# Patient Record
Sex: FEMALE | Race: ASIAN | NOT HISPANIC OR LATINO | Employment: FULL TIME | URBAN - METROPOLITAN AREA
[De-identification: names, ages, dates, MRNs, and addresses within clinical notes are randomized per-mention and may not be internally consistent; named-entity substitution may affect disease eponyms.]

---

## 2020-06-03 LAB
HBV SURFACE AG SERPL QL IA: NEGATIVE
HIV 1+2 AB+HIV1 P24 AG SERPL QL IA: NEGATIVE
RUBELLA ANTIBODY IGG QUANTITATIVE: NORMAL IU/ML

## 2020-10-24 ENCOUNTER — COMMUNICATION - HEALTHEAST (OUTPATIENT)
Dept: SCHEDULING | Facility: CLINIC | Age: 25
End: 2020-10-24

## 2020-10-24 ENCOUNTER — HOSPITAL ENCOUNTER (INPATIENT)
Facility: CLINIC | Age: 25
LOS: 1 days | Discharge: HOME | End: 2020-10-25
Attending: OBSTETRICS & GYNECOLOGY | Admitting: OBSTETRICS & GYNECOLOGY
Payer: COMMERCIAL

## 2020-10-24 DIAGNOSIS — Z30.011 ENCOUNTER FOR INITIAL PRESCRIPTION OF CONTRACEPTIVE PILLS: Primary | ICD-10-CM

## 2020-10-24 PROBLEM — O60.00 PRETERM LABOR: Status: ACTIVE | Noted: 2020-10-24

## 2020-10-24 LAB
ABO + RH BLD: NORMAL
ABO + RH BLD: NORMAL
ALT SERPL W P-5'-P-CCNC: 13 U/L (ref 0–50)
AST SERPL W P-5'-P-CCNC: 9 U/L (ref 0–45)
BLD GP AB SCN SERPL QL: NORMAL
BLOOD BANK CMNT PATIENT-IMP: NORMAL
CREAT SERPL-MCNC: 0.63 MG/DL (ref 0.52–1.04)
ERYTHROCYTE [DISTWIDTH] IN BLOOD BY AUTOMATED COUNT: 14.3 % (ref 10–15)
GFR SERPL CREATININE-BSD FRML MDRD: >90 ML/MIN/{1.73_M2}
HCT VFR BLD AUTO: 31.6 % (ref 35–47)
HGB BLD-MCNC: 9.9 G/DL (ref 11.7–15.7)
MCH RBC QN AUTO: 25.6 PG (ref 26.5–33)
MCHC RBC AUTO-ENTMCNC: 31.3 G/DL (ref 31.5–36.5)
MCV RBC AUTO: 82 FL (ref 78–100)
PLATELET # BLD AUTO: 196 10E9/L (ref 150–450)
RBC # BLD AUTO: 3.86 10E12/L (ref 3.8–5.2)
SPECIMEN EXP DATE BLD: NORMAL
T PALLIDUM AB SER QL: NONREACTIVE
WBC # BLD AUTO: 22.9 10E9/L (ref 4–11)

## 2020-10-24 PROCEDURE — 36415 COLL VENOUS BLD VENIPUNCTURE: CPT | Performed by: STUDENT IN AN ORGANIZED HEALTH CARE EDUCATION/TRAINING PROGRAM

## 2020-10-24 PROCEDURE — 82565 ASSAY OF CREATININE: CPT | Performed by: STUDENT IN AN ORGANIZED HEALTH CARE EDUCATION/TRAINING PROGRAM

## 2020-10-24 PROCEDURE — 84460 ALANINE AMINO (ALT) (SGPT): CPT | Performed by: STUDENT IN AN ORGANIZED HEALTH CARE EDUCATION/TRAINING PROGRAM

## 2020-10-24 PROCEDURE — 84450 TRANSFERASE (AST) (SGOT): CPT | Performed by: STUDENT IN AN ORGANIZED HEALTH CARE EDUCATION/TRAINING PROGRAM

## 2020-10-24 PROCEDURE — 86901 BLOOD TYPING SEROLOGIC RH(D): CPT | Performed by: STUDENT IN AN ORGANIZED HEALTH CARE EDUCATION/TRAINING PROGRAM

## 2020-10-24 PROCEDURE — 258N000003 HC RX IP 258 OP 636: Performed by: STUDENT IN AN ORGANIZED HEALTH CARE EDUCATION/TRAINING PROGRAM

## 2020-10-24 PROCEDURE — 250N000013 HC RX MED GY IP 250 OP 250 PS 637: Performed by: STUDENT IN AN ORGANIZED HEALTH CARE EDUCATION/TRAINING PROGRAM

## 2020-10-24 PROCEDURE — 86900 BLOOD TYPING SEROLOGIC ABO: CPT | Performed by: STUDENT IN AN ORGANIZED HEALTH CARE EDUCATION/TRAINING PROGRAM

## 2020-10-24 PROCEDURE — 86850 RBC ANTIBODY SCREEN: CPT | Performed by: STUDENT IN AN ORGANIZED HEALTH CARE EDUCATION/TRAINING PROGRAM

## 2020-10-24 PROCEDURE — 250N000011 HC RX IP 250 OP 636: Performed by: STUDENT IN AN ORGANIZED HEALTH CARE EDUCATION/TRAINING PROGRAM

## 2020-10-24 PROCEDURE — 86780 TREPONEMA PALLIDUM: CPT | Performed by: STUDENT IN AN ORGANIZED HEALTH CARE EDUCATION/TRAINING PROGRAM

## 2020-10-24 PROCEDURE — 85027 COMPLETE CBC AUTOMATED: CPT | Performed by: STUDENT IN AN ORGANIZED HEALTH CARE EDUCATION/TRAINING PROGRAM

## 2020-10-24 PROCEDURE — 88305 TISSUE EXAM BY PATHOLOGIST: CPT | Mod: 26 | Performed by: PATHOLOGY

## 2020-10-24 PROCEDURE — 250N000009 HC RX 250

## 2020-10-24 PROCEDURE — 88305 TISSUE EXAM BY PATHOLOGIST: CPT | Mod: TC | Performed by: STUDENT IN AN ORGANIZED HEALTH CARE EDUCATION/TRAINING PROGRAM

## 2020-10-24 PROCEDURE — 258N000003 HC RX IP 258 OP 636: Performed by: OBSTETRICS & GYNECOLOGY

## 2020-10-24 PROCEDURE — 250N000011 HC RX IP 250 OP 636: Performed by: OBSTETRICS & GYNECOLOGY

## 2020-10-24 PROCEDURE — 59409 OBSTETRICAL CARE: CPT | Mod: GC | Performed by: OBSTETRICS & GYNECOLOGY

## 2020-10-24 PROCEDURE — 722N000001 HC LABOR CARE VAGINAL DELIVERY SINGLE

## 2020-10-24 PROCEDURE — 120N000002 HC R&B MED SURG/OB UMMC

## 2020-10-24 RX ORDER — FERROUS SULFATE 325(65) MG
325 TABLET, DELAYED RELEASE (ENTERIC COATED) ORAL DAILY
COMMUNITY
End: 2022-07-12

## 2020-10-24 RX ORDER — AMOXICILLIN 250 MG
2 CAPSULE ORAL 2 TIMES DAILY
Status: DISCONTINUED | OUTPATIENT
Start: 2020-10-24 | End: 2020-10-25 | Stop reason: HOSPADM

## 2020-10-24 RX ORDER — OXYTOCIN/0.9 % SODIUM CHLORIDE 30/500 ML
340 PLASTIC BAG, INJECTION (ML) INTRAVENOUS CONTINUOUS PRN
Status: DISCONTINUED | OUTPATIENT
Start: 2020-10-24 | End: 2020-10-25 | Stop reason: HOSPADM

## 2020-10-24 RX ORDER — MODIFIED LANOLIN
OINTMENT (GRAM) TOPICAL
Status: DISCONTINUED | OUTPATIENT
Start: 2020-10-24 | End: 2020-10-25 | Stop reason: HOSPADM

## 2020-10-24 RX ORDER — PENICILLIN G POTASSIUM 5000000 [IU]/1
5 INJECTION, POWDER, FOR SOLUTION INTRAMUSCULAR; INTRAVENOUS ONCE
Status: DISCONTINUED | OUTPATIENT
Start: 2020-10-24 | End: 2020-10-24

## 2020-10-24 RX ORDER — IBUPROFEN 800 MG/1
800 TABLET, FILM COATED ORAL EVERY 6 HOURS PRN
Status: DISCONTINUED | OUTPATIENT
Start: 2020-10-24 | End: 2020-10-25 | Stop reason: HOSPADM

## 2020-10-24 RX ORDER — HYDROCORTISONE 2.5 %
CREAM (GRAM) TOPICAL 3 TIMES DAILY PRN
Status: DISCONTINUED | OUTPATIENT
Start: 2020-10-24 | End: 2020-10-25 | Stop reason: HOSPADM

## 2020-10-24 RX ORDER — FENTANYL CITRATE 50 UG/ML
50 INJECTION, SOLUTION INTRAMUSCULAR; INTRAVENOUS
Status: DISCONTINUED | OUTPATIENT
Start: 2020-10-24 | End: 2020-10-24

## 2020-10-24 RX ORDER — MAGNESIUM SULFATE IN WATER 40 MG/ML
2 INJECTION, SOLUTION INTRAVENOUS CONTINUOUS
Status: DISCONTINUED | OUTPATIENT
Start: 2020-10-24 | End: 2020-10-24

## 2020-10-24 RX ORDER — SODIUM CHLORIDE, SODIUM LACTATE, POTASSIUM CHLORIDE, CALCIUM CHLORIDE 600; 310; 30; 20 MG/100ML; MG/100ML; MG/100ML; MG/100ML
INJECTION, SOLUTION INTRAVENOUS CONTINUOUS
Status: DISCONTINUED | OUTPATIENT
Start: 2020-10-24 | End: 2020-10-24

## 2020-10-24 RX ORDER — LABETALOL 100 MG/1
100 TABLET, FILM COATED ORAL 2 TIMES DAILY
COMMUNITY
End: 2022-03-07

## 2020-10-24 RX ORDER — INDOMETHACIN 25 MG/1
25 CAPSULE ORAL EVERY 6 HOURS
Status: DISCONTINUED | OUTPATIENT
Start: 2020-10-24 | End: 2020-10-24

## 2020-10-24 RX ORDER — NALOXONE HYDROCHLORIDE 0.4 MG/ML
.1-.4 INJECTION, SOLUTION INTRAMUSCULAR; INTRAVENOUS; SUBCUTANEOUS
Status: DISCONTINUED | OUTPATIENT
Start: 2020-10-24 | End: 2020-10-24

## 2020-10-24 RX ORDER — OXYTOCIN/0.9 % SODIUM CHLORIDE 30/500 ML
100 PLASTIC BAG, INJECTION (ML) INTRAVENOUS CONTINUOUS
Status: DISCONTINUED | OUTPATIENT
Start: 2020-10-24 | End: 2020-10-25 | Stop reason: HOSPADM

## 2020-10-24 RX ORDER — CALCIUM GLUCONATE 94 MG/ML
1 INJECTION, SOLUTION INTRAVENOUS
Status: DISCONTINUED | OUTPATIENT
Start: 2020-10-24 | End: 2020-10-24

## 2020-10-24 RX ORDER — OXYTOCIN 10 [USP'U]/ML
10 INJECTION, SOLUTION INTRAMUSCULAR; INTRAVENOUS
Status: DISCONTINUED | OUTPATIENT
Start: 2020-10-24 | End: 2020-10-25 | Stop reason: HOSPADM

## 2020-10-24 RX ORDER — OXYTOCIN 10 [USP'U]/ML
INJECTION, SOLUTION INTRAMUSCULAR; INTRAVENOUS
Status: DISCONTINUED
Start: 2020-10-24 | End: 2020-10-24 | Stop reason: WASHOUT

## 2020-10-24 RX ORDER — BISACODYL 10 MG
10 SUPPOSITORY, RECTAL RECTAL DAILY PRN
Status: DISCONTINUED | OUTPATIENT
Start: 2020-10-26 | End: 2020-10-25 | Stop reason: HOSPADM

## 2020-10-24 RX ORDER — MISOPROSTOL 200 UG/1
TABLET ORAL
Status: DISCONTINUED
Start: 2020-10-24 | End: 2020-10-24 | Stop reason: WASHOUT

## 2020-10-24 RX ORDER — MISOPROSTOL 200 UG/1
800 TABLET ORAL
Status: DISCONTINUED | OUTPATIENT
Start: 2020-10-24 | End: 2020-10-25 | Stop reason: HOSPADM

## 2020-10-24 RX ORDER — PRENATAL VIT/IRON FUM/FOLIC AC 27MG-0.8MG
1 TABLET ORAL DAILY
COMMUNITY
End: 2022-07-12

## 2020-10-24 RX ORDER — LIDOCAINE HYDROCHLORIDE 10 MG/ML
INJECTION, SOLUTION EPIDURAL; INFILTRATION; INTRACAUDAL; PERINEURAL
Status: DISCONTINUED
Start: 2020-10-24 | End: 2020-10-24 | Stop reason: WASHOUT

## 2020-10-24 RX ORDER — CARBOPROST TROMETHAMINE 250 UG/ML
250 INJECTION, SOLUTION INTRAMUSCULAR
Status: DISCONTINUED | OUTPATIENT
Start: 2020-10-24 | End: 2020-10-25 | Stop reason: HOSPADM

## 2020-10-24 RX ORDER — LIDOCAINE 40 MG/G
CREAM TOPICAL
Status: DISCONTINUED | OUTPATIENT
Start: 2020-10-24 | End: 2020-10-24

## 2020-10-24 RX ORDER — ACETAMINOPHEN 325 MG/1
650 TABLET ORAL EVERY 4 HOURS PRN
Status: DISCONTINUED | OUTPATIENT
Start: 2020-10-24 | End: 2020-10-25 | Stop reason: HOSPADM

## 2020-10-24 RX ORDER — CLINDAMYCIN PHOSPHATE 900 MG/50ML
900 INJECTION, SOLUTION INTRAVENOUS EVERY 8 HOURS
Status: DISCONTINUED | OUTPATIENT
Start: 2020-10-24 | End: 2020-10-24

## 2020-10-24 RX ORDER — INDOMETHACIN 50 MG/1
50 CAPSULE ORAL ONCE
Status: COMPLETED | OUTPATIENT
Start: 2020-10-24 | End: 2020-10-24

## 2020-10-24 RX ORDER — AMOXICILLIN 250 MG
1 CAPSULE ORAL 2 TIMES DAILY
Status: DISCONTINUED | OUTPATIENT
Start: 2020-10-24 | End: 2020-10-25 | Stop reason: HOSPADM

## 2020-10-24 RX ORDER — OXYTOCIN/0.9 % SODIUM CHLORIDE 30/500 ML
PLASTIC BAG, INJECTION (ML) INTRAVENOUS
Status: COMPLETED
Start: 2020-10-24 | End: 2020-10-24

## 2020-10-24 RX ORDER — LABETALOL 100 MG/1
100 TABLET, FILM COATED ORAL 2 TIMES DAILY
Status: DISCONTINUED | OUTPATIENT
Start: 2020-10-24 | End: 2020-10-25 | Stop reason: HOSPADM

## 2020-10-24 RX ORDER — NALOXONE HYDROCHLORIDE 0.4 MG/ML
.1-.4 INJECTION, SOLUTION INTRAMUSCULAR; INTRAVENOUS; SUBCUTANEOUS
Status: DISCONTINUED | OUTPATIENT
Start: 2020-10-24 | End: 2020-10-25 | Stop reason: HOSPADM

## 2020-10-24 RX ADMIN — SODIUM CHLORIDE 2.5 MILLION UNITS: 9 INJECTION, SOLUTION INTRAVENOUS at 05:35

## 2020-10-24 RX ADMIN — OXYTOCIN-SODIUM CHLORIDE 0.9% IV SOLN 30 UNIT/500ML 100 ML/HR: 30-0.9/5 SOLUTION at 09:34

## 2020-10-24 RX ADMIN — IBUPROFEN 800 MG: 800 TABLET ORAL at 09:33

## 2020-10-24 RX ADMIN — SODIUM CHLORIDE, POTASSIUM CHLORIDE, SODIUM LACTATE AND CALCIUM CHLORIDE: 600; 310; 30; 20 INJECTION, SOLUTION INTRAVENOUS at 08:28

## 2020-10-24 RX ADMIN — Medication 100 ML/HR: at 09:34

## 2020-10-24 RX ADMIN — IBUPROFEN 800 MG: 800 TABLET ORAL at 23:21

## 2020-10-24 RX ADMIN — DOCUSATE SODIUM AND SENNOSIDES 1 TABLET: 8.6; 5 TABLET ORAL at 19:31

## 2020-10-24 RX ADMIN — LABETALOL HYDROCHLORIDE 100 MG: 100 TABLET, FILM COATED ORAL at 19:57

## 2020-10-24 RX ADMIN — GENTAMICIN SULFATE 170 MG: 40 INJECTION, SOLUTION INTRAMUSCULAR; INTRAVENOUS at 08:28

## 2020-10-24 RX ADMIN — FENTANYL CITRATE 50 MCG: 50 INJECTION, SOLUTION INTRAMUSCULAR; INTRAVENOUS at 06:01

## 2020-10-24 RX ADMIN — FENTANYL CITRATE 50 MCG: 50 INJECTION, SOLUTION INTRAMUSCULAR; INTRAVENOUS at 04:28

## 2020-10-24 RX ADMIN — SODIUM CHLORIDE, POTASSIUM CHLORIDE, SODIUM LACTATE AND CALCIUM CHLORIDE: 600; 310; 30; 20 INJECTION, SOLUTION INTRAVENOUS at 04:28

## 2020-10-24 RX ADMIN — INDOMETHACIN 50 MG: 50 CAPSULE ORAL at 04:28

## 2020-10-24 NOTE — PROGRESS NOTES
Vaginal Delivery Note   of viable Male with Dr Lyons and Dr Cortez in attendance.  NICU present.  Infant brought over to warmer and NICU took over infant care. Placenta delivered with out complication, none, first laceration, without repair, bernard cares provided.  Mother in stable condition and baby brought over to NICU.

## 2020-10-24 NOTE — L&D DELIVERY NOTE
OB Vaginal Delivery Note    Tony Nova MRN# [id number]   Age: 24 year old YOB: 1995       GA: Unknown  GP:   Labor Complications: Chorioamnionitis   EBL:   mL  Delivery QBL: 145 mL  Delivery Type: Vaginal, Spontaneous   ROM to Delivery Time: (Delivered) Hours: 22 Minutes: 7  [place] Weight: 0.92 kg (2 lb 0.5 oz)    1 Minute 5 Minute 10 Minute   Apgar Totals: 1   2   6     CHEVY MARS;SARAY POMPA;ADRIANA MCELROY     Delivery Details:  Tony Nova is a 24 year old  female who was admitted as a transfer of care with PPROM at 26w6d. She received 1 dose of BMZ prior to transfer to [facility] and was started on IV magnesium for neuroprotection prophylaxis.  She received 1 dose of indomethacin on arrival (0428) however was painfully jorje and was found to be making cervical change. She was started on PCN for GBS prophylaxis. She received 1 dose of fentanyl for pain control. FHT became category II with fetal tachycardia and patient had a temperature to 100.3. Due to concern for developing chorioamnionitis she received 1 dose of Gentamycin (following delivery). She progressed to complete at 0745, pushed with excellent maternal effort, and delivered a liveborn male infant from OA position at 0807 on 10/24/2020. Nuchal cord x1, delivered through and then immediately reduced.  handed off to awaiting NICU team. APGARs 1, 2, 6, and 7 at 1 and 5 minutes, 10, 15 min. Weight 920g. Cord immediately clamped and cut, cord segment obtained and sent for gases. IV pitocin was started.  Placenta delivered spontaneously with gentle cord traction and suprapubic countertraction; intact 3V cord, placenta intact.  Examination of perineum revealed a small first degree laceration noted. This was hemostatic after applying pressure and therefore not repaired. . Fundus firm and perineum hemostatic.  Dr. Mcelroy present for delivery     FHTs were difficult to detect while pushing, but fetus was not  noted to be bradycardic- FHTs in 1102-140s while pushing and in between pushing efforts.        Reid Nova-Tony [[id number]]    Labor Event Times    Dilation complete date: 10/24/20 Complete time:  7:45 AM   Start pushing date/time: 10/24/2020 0747      Labor Length    2nd Stage (hrs): 0 (min): 22   3rd Stage (hrs): 0 (min): 5      Labor Events     labor?: Yes   steroids: Partial Course  Labor Type: Spontaneous  Predominate monitoring during 1st stage: continuous electronic fetal monitoring     Antibiotics received during labor?: Yes  Reason for Antibiotics: GBS, Chorioamnionitis  Antibiotics received for GBS: Penicillin  Antibiotics given for Chorioamnionitis: Clindamycin/Gentamicin  Antibiotics Given (Chorioamnionitis): 2-3.9 hours prior to delivery     Rupture identifier: Sac 1  Rupture date/time: 10/23/20 1000   Rupture type: Spontaneous rupture of membranes occuring during spontaneous labor or augmentation  Fluid color: Clear     Delivery/Placenta Date and Time    Delivery Date: 10/24/20 Delivery Time:  8:07 AM   Placenta Date/Time: 10/24/2020  8:12 AM  Oxytocin given at the time of delivery: after delivery of baby     Vaginal Counts     Initial count performed by 2 team members:  Two Team Members   OCTAVIANO Cortez       Needles [employer]   Initial counts 2  5    Added to count       Final counts 2  5    Placed during labor Accounted for at the end of labor   No    No    No     Final count performed by 2 team members:  Two Team Members   OCTAVIANO Cortez      Final count correct?: Yes     Apgars    Living status: Living   1 Minute 5 Minute 10 Minute 15 Minute 20 Minute   Skin color: 0  0  1  1     Heart rate: 1  1  2  2     Reflex irritability: 0  0  0  1     Muscle tone: 0  0  1  1     Respiratory effort: 0  1  2  2     Total: 1  2  6  7     Apgars assigned by: KATHE SHELTON,CNP     Cord    Vessels: 3 Vessels Complications: None   Cord Blood Disposition:  Lab Gases Sent?: Yes      [place] Resuscitation    Methods: Suctioning, Oxygen, NCPAP, Oximetry, Temp Skin Control, Temp Skin Probe, Transwarmer, Intubation, Mark Puff, Polyethylene Bag  [place] Care at Delivery: NICU team called by Dr. Lyons and Dr. Cortez to attend a vaginal delivery of a , 26w6d GA infant for PPROM, PTL, chorioamnionitis with fetal tachycardia. Infant presented with apnea, cyanosis, and was limp.  Cord cut and clamped, infant placed in polyethylene bag and brought to radiant warmer. PPV PIP 25, PEEP +6, FiO2 30% administered for apnea.  Preductal pulse oximeter placed on R hand, ECG leads placed.  HR 80s, SpO2 20s with PPV 25/6.  FiO2 increased to 50% then 100%.  Initially observed chest rise, breath sounds diminished but heart rate not increasing.  Mask adjusted, head and shoulders repositioned, PPV delivered, still unable to attain good chest rise, mouth suctioned, PPV continued. At 5 minutes of age, infant with occasional respirations but PPV 25/6 administered, infant pale and still limp, HR 120s, SpO2 25%.  Infant intubated at 6 minutes of age on the second attempt with a 2.5ETT and 00 blade.  Condensation observed in ETT, colorimetric confirmation obtained, and equal but diminished breath sounds auscultated when ETT retracted to 7cm.  At this time HR 140s but SpO2 ~50% with PPV 25/6 FiO2 100%, 2.5mls of surfactant given in 2 aliquots intratracheally.  At 15 minutes of age, HR 140s, SpO2 92% while receiving PPV 25/6 FiO2 30%, he was pale pink and had some flexion. Infant shown to parents and they were updated at the bedside.  Father accompanied NICU team as we transported infant to NICU for further management of prematurity and respiratory failure with RDS.     Sylvia Walton, APRN, CNP  10/24/2020 10:07 AM    Output in Delivery Room: Voided     [place] Measurements    Weight: 2 lb 0.5 oz       Labor Events and Shoulder Dystocia    Fetal Tracing Prior to Delivery: Category  1  Shoulder dystocia present?: Neg     Delivery (Maternal) (Provider to Complete) (138179)    Episiotomy: None  Perineal lacerations: 1st Repaired?: No      Blood Loss  Mother: Tony Nova #[id number]   Start of Mother's Information    IO Blood Loss  10/23/20 2007 - 10/24/20 1123    Delivery QBL (mL) Hospital Encounter 175 mL    Total  175 mL         End of Mother's Information  Mother: Tony Nova #[id number]          Delivery - Provider to Complete ([id number])    Delivering clinician: May Lyons DO  Attempted Delivery Types (Choose all that apply): Spontaneous Vaginal Delivery  Delivery Type (Choose the 1 that will go to the Birth History): Vaginal, Spontaneous                   Other personnel:  Provider Role   Isis Ann RN Hagen, Jennifer A, MD Jacobs, Katherine Marie, DO                 Placenta    Immediate Cord Clamping: Done  Date/Time: 10/24/2020  8:12 AM  Removal: Spontaneous  Disposition: Pathology           Anesthesia    Method: INTRAVENOUS REGIONAL                Presentation and Position    Presentation: Vertex     Occiput Anterior                 FACULTY NOTE:  I was present for the entire second and 3rd labor stage.  Patient pushed very effectively.  Concern for developing chorio given fetal and maternal tachycardia/temp to 100.3 noted prior to delivery.  Patient may have been ruptured for >24 hrs prior to delivery and at least 12 hrs prior to admission for PTL.     Uncomplicated  at 26 6/7 weeks, infant with good color, but no respiratory effort and poor tone at birth.  Cord clamped and cut immediately and baby handed to awaiting NICU staff. Placenta delivered spont. Small first degree abrasion- hemostatic, not requiring suture.     See above NICU note for details.     May Lyons DO FACOG  Maternal Fetal Medicine Specialist  Pager: [phone]  Mobile: [phone]

## 2020-10-24 NOTE — PROVIDER NOTIFICATION
10/24/20 4267   Provider Notification   Provider Name/Title Brett   Method of Notification Electronic Page   Request Evaluate-Remote   Notification Reason Other  (plan for IV Gentamicin)     In report, plan was for one time dose of Gentamicin per Dr. Lyons. Pt has Gentamicin scheduled every 8 hours. Could you clarify when you get a chance? Thanks.

## 2020-10-24 NOTE — PLAN OF CARE
Patient transferred from [place] [facility] for PTL.  Patient presents to birth place via ambulance jorje q3-4 minutes.  See flow sheet for FHR. SVE per nursing report from previous hospital = 2-3cm.   Magnesium running at 2g/hr.  Reports pain from contractions and back ache. Denies pre-e symptoms.  VSS.  Reports leaking clear/yellow fluid.  Notes positive fetal movement.  MD notified of arrival.  Vertex upon ultrasound.  Plan is to continue magnesium, and pcn, start indocin, and manage pain.

## 2020-10-24 NOTE — PROGRESS NOTES
Patient arrived to [facility] unit via wheelchair at 1025,with belongings, accompanied by spouse/ significant other, with infant in NICU. Received report from Isis Ann RN and checked bands. Unit and room orientation completd. Call light given and within arms reach; no concerns present at this time. Continue with plan of care.

## 2020-10-24 NOTE — PROGRESS NOTES
[facility]   Post-partum Note    Name:  Tony Nova  MRN: [id number]    S: Patient doing very well this morning.  Pain is minimal.  Tolerating regular diet without nausea or vomiting. Did notice some dizziness with ambulation at first but now resolved.  Spontaneously voiding. Lochia less than menses.  Planning on the donor breast milk program for baby. Does not desires anything for postpartum contraception.     O:   Patient Vitals for the past 4 hrs:   BP Temp src Resp   10/25/20 0817 128/67 Oral 16   Gen:  Resting comfortably, NAD  CV:  Well perfused  Pulm:  Unlabored breathing on RA  Abd:  Soft, nontender, non-distended. Fundus below umbilicus, firm and non-tender.  Ext:  non-tender, trace edema bilaterally    I/O last 3 completed shifts:  In: -   Out: 175 [Blood:175]    Hgb:   Hemoglobin   Date Value Ref Range Status   10/25/2020 8.8 (L) 11.7 - 15.7 g/dL Final       Assessment/Plan:  24 year old  on PPD #1 s/p uncomplicated  at 26w6d after presenting with ruptured membranes in  labor. Doing well in the early postpartum period.    #Routine postpartum care  -PNC:   Rh pos. Rubella immune. No intervention indicated.  -Pain: Well-controlled with ibuprofen and tylenol  -Hgb: 9.9>>8.8. Asymptomatic, vital signs stable. Will discharge home with iron.  -GI Tolerating regular diet. Prn stool softener, anti-emetics  - Voiding spontaneously   -ID Concern for developing chorioamnionitis just prior to delivery with fetal and maternal tachycardia, rising temperature (Tmax 100.3).  S/p 1 dose of gentamycin immediately following delivery. Leukocytosis noted however was drawn after BMZ dose. No ongoing clinical concerns for infection/postpartum endometritis.  -Feed: Donor breast mild program  -BC: Declines    # delivery at 26w6d  -Infectious work-up at outside hospital unremarkable  -SW consulted     #Chronic hypertension  -Continue home Labetalol 100mg BID, BP currently  normal  -HELLP labs on admission wnl    Dispo: DC likely PPD#2    Desiree Cortez MD  Ob/Gyn PGY-3  October 25, 2020 8:32 AM    I personally examined and evaluated Tony Nova on 10/25/2020.  I discussed the patient with Dr. Cortez and agree with the presentation, exam and plan of care documented in this note with edits by me. Patient desires discharge today.  Mandy Bailey MD MPH    Mandy Bailey MD MPH

## 2020-10-24 NOTE — H&P
"[facility]  OB History and Physical      Tony Nova MRN# [id number]   Age: 24 year old YOB: 1995     CC:  Contractions    HPI:  Ms. Tony Nova is a 24 year old  at 26w6d who presents as a transfer of care from [facility] due to  labor. Patient notes she started having contractions yesterday morning and thought they were normal \"marci-grimes.\" She then decided to go in to get them checked out, at which point she was found to be 3 cm dilated. She also notes clear fluid leakage since yesterday, however the provider at the other hospital noted on speculum exam a large \"bulging bag.\" She denies vaginal bleeding.   + normal fetal movement.    Pregnancy Complications:  -  Chronic hypertension, labetalol 100 mg BID    Prenatal Labs:   No results found for: ABO, RH, AS, HEPBANG, CHPCRT, GCPCRT, TREPAB, RPR, RUBELLAABIGG, HGB, HIV    GBS Status:   No results found for: GBS    OB History  OB History    Para Term  AB Living   1 0 0 0 0 0   SAB TAB Ectopic Multiple Live Births   0 0 0 0 0      # Outcome Date GA Lbr Davian/2nd Weight Sex Delivery Anes PTL Lv   1 Current                PMHx:   Past Medical History:   Diagnosis Date     Hypertension      PSHx:   No past surgical history on file.  Meds:   No medications prior to admission.     Allergies:  No Known Allergies   FmHx: No family history on file.  SocHx: She denies any tobacco, alcohol, or other drug use during this pregnancy.    ROS:   Complete 10-point ROS negative except as noted in HPI.She denies headache, blurry vision, chest pain, shortness of breath, RUQ pain, nausea, vomiting, dysuria, hematuria or extremity edema.    PE:  Vit:   Patient Vitals for the past 4 hrs:   BP Temp Temp src Pulse Resp SpO2   10/24/20 0325 115/62 98.1  F (36.7  C) Oral 113 16 98 %      Gen: Well-appearing, NAD, comfortable when not jorje. Breathing through contractions.   CV: rrr, no mrg   Pulm: Ctab, no " wheezes or crackles   Abd: Soft, gravid, non-tender  Ext: trace LE edema b/l  Cx: Deferred     Pres:  ceph by BSUS      FHT: Baseline 155, mod variability, no accelerations, intermittent variable decelerations   [place]: 3 contractions in 10 minutes      Assessment  Ms. Tony Nova is a 24 year old  at 26w6d who is admitted in  labor.     Plan  Admit to L&D.     #  Labor  - Cervix: exam performed at outside facility, 3/100/0 with bulging bag of water  - Labs: UA, Wet Prep, GBS collected at [facility]   - IVF Hydration  - Tocolysis: Start indomethacin   - Mode of delivery: vaginal given cephalic presentation  - BMZ: first dose given at [facility]   - Magnesium: started at outside facility, continue at 2g/h     # Possible PPROM  - ROM+ positive at outside facility, however provider noted prominent bag on exam and good fluid level on US   - Difficult to know if rupture has occurred at this point, however patient does not currently seem infected - will still proceed with tocolysis at this time     #Chronic hypertension:   - Labetalol 100 mg BID    The patient was discussed with Dr. Lyons who is in agreement with the treatment plan.    Anisa Lei MD  OBGYN PGY-3  3:35 AM 10/24/2020    Physician Attestation   I, May Lyons, DO, saw and evaluated Tony Nova with the resident.  I have reviewed and discussed with Dr. Lei their history, physical and plan.    I personally reviewed the vital signs, medications, labs and imaging.    My key history or physical exam findings:   Patient here in  labor.  She is jorje frequently and is uncomfortable.  Indocin given and magnesium running.  S/p 1st dose of BMZ.  PCN started.      Cephalic on BSUS.     Key management decisions made by me:   Discussed with patient- see later progress note for details.  Labor is progressing.  Additionally, fetal and maternal tachycardia have developed concerning for chorioamnionitis.  Amp/Gent  ordered.  If patient doesn't progress on her own then labor augmentation may be necessary.     May Lyons DO  Date of Service (when I saw the patient): 10/24/20    Time Spent on this Encounter   I, May Lyons DO, spent a total of 30 minutes face to face or coordinating care of Tony Nova.  Over 50% of my time on the unit was spent counseling the patient and/or coordinating care regarding  labor.

## 2020-10-24 NOTE — PROVIDER NOTIFICATION
10/24/20 0638   Provider Notification   Provider Name/Title Lyons   Method of Notification In Department   Notification Reason Fetal Baseline Change       Maternal tachycardia and fetal tachycardia persistent despite fluid bolus and position changes.  MD at bedside.  SVE: 6 cm.  Patient temp 100.3.  Order to start gentamycin and clindamycin.

## 2020-10-24 NOTE — PLAN OF CARE
VSS. Pt afebrile. Pt denies pain. Pt choosing to formula feed or donor milk feed infant, does not want to pump. Pt tolerating regular diet and voiding without difficulty. Postpartum checks WNL. Pt c/o some slight dizziness when ambulating, only for a brief period. Pt and significant other down in NICU visiting infant.

## 2020-10-24 NOTE — PROGRESS NOTES
Dr. Lyons updated on patient status, feeling pressure and shaking.  Doctor not available to do cervical exam at this time, order for RN to do cervical exam received by Dr. Lyons.  NOA 4/90/+1.

## 2020-10-24 NOTE — PROGRESS NOTES
Data: Tony Nova transferred to 7145 via wheelchair at 1015.   Action: Receiving unit notified of transfer: Yes. Patient and family notified of room change. Report given to Melody at 0945. Belongings sent to receiving unit. Accompanied by Registered Nurse. Oriented patient to surroundings. Call light within reach. ID bands double-checked with receiving RN.  Response: Patient tolerated transfer and is stable.

## 2020-10-24 NOTE — DISCHARGE SUMMARY
VAGINAL DELIVERY DISCHARGE SUMMARY    Admit date: 10/24/2020  Discharge date: 10/25/20    Admit Dx:   -  at 26w6d  - PPROM/ labor  - Chronic hypertension    Discharge Dx:  - Same as above, s/p   - Suspected chorioamnionitis     Procedures:  - Spontaneous vaginal delivery    HPI/Labor Course:   Tony Nova is a 24 year old  female who was admitted as a transfer of care with PPROM at 22w6d. She received 1 dose of BMZ prior to transfer to [facility] and was started on IV magnesium for seizure prophylaxis.  She received 1 dose of indomethacin on arrival (428) however was painfully jorje and was found to be making cervical change. She was started on PCN for GBS prophylaxis. She received 1 dose of fentanyl for pain control. FHT became category II with fetal tachycardia and patient had a temperature to 100.3. Due to concern for developing chorioamnionitis she received 1 dose of Gentamycin. She progressed to complete at 0745, pushed with excellent maternal effort, and delivered a liveborn male infant from OA position at 0807 on 10/24/2020. Nuchal cord x1, delivered through and then immediately reduced.  handed off to awaiting NICU team. APGARs 1, 2, 6, and 7 at 1 and 5 minutes. Weight 920g. Cord immediately clamped and cut, cord segment obtained and sent for gases. IV pitocin was started.  Placenta delivered spontaneously with gentle cord traction and suprapubic countertraction; intact 3V cord, placenta intact.  Examination of perineum revealed a small first degree laceration noted. This was hemostatic after applying pressure and therefore not repaired. . Fundus firm and perineum hemostatic.    Postpartum course:  Her postpartum course was uncomplicated. Her BP were normal range postpartum. On PPD#1, she was meeting all of her postpartum goals and deemed stable for discharge. She was voiding without difficulty, tolerating a regular diet without nausea and vomiting, her pain was well controlled  on oral pain medicines and her lochia was appropriate. Her hemoglobin prior to delivery was 9.9 and after delivery was 8.8. Her Rh status was positive, and Rhogam was not indicated.     Discharge Medications:  Current Discharge Medication List      START taking these medications    Details   ibuprofen (ADVIL/MOTRIN) 800 MG tablet Take 1 tablet (800 mg) by mouth every 6 hours as needed for other (cramping)  Qty: 60 tablet, Refills: 4    Associated Diagnoses:  (normal spontaneous vaginal delivery)      levonorgestrel-ethinyl estradiol (AVIANE) 0.1-20 MG-MCG tablet Take 1 tablet by mouth daily  Qty: 84 tablet, Refills: 4    Associated Diagnoses: Encounter for initial prescription of contraceptive pills         CONTINUE these medications which have NOT CHANGED    Details   ferrous sulfate (FE TABS) 325 (65 Fe) MG EC tablet Take 325 mg by mouth daily      labetalol (NORMODYNE) 100 MG tablet Take 100 mg by mouth 2 times daily      Prenatal Vit-Fe Fumarate-FA (PRENATAL MULTIVITAMIN W/IRON) 27-0.8 MG tablet Take 1 tablet by mouth daily           Discharge/Disposition:  Tony Nova was discharged to home in stable condition with the following instructions/medications:  1) Call for temperature > 100.4, bright red vaginal bleeding >1 pad an hour x 2 hours, foul smelling vaginal discharge, pain not controlled by usual oral pain meds, persistent nausea and vomiting not controlled on medications  2) She desired COCs for contraception which she was prescribed at discharge  3) For feeding she decided to bottlefeed.  4) She was instructed to follow-up with her primary OB in 6 weeks for a routine postpartum visit and in 1 week for a BP check.    Job West MD  OB/GYN PGY-1  10/25/20 6:29 PM    I personally examined and evaluated Tony Nova on 10/25/2020.  I agree with the presentation, hospital details and plan of care this discharge summary with edits by me.   Mandy Bailey MD MPH

## 2020-10-24 NOTE — PROGRESS NOTES
MFM progress note:     Patient is progressing.  Has been shaky and uncomfortable with contractions.  RN cervix assessment was 4/90 (previously 3 cm).      I was just notified of maternal and fetal tachycardia.  Patient admits to having small gushes of fluid that began yesterday.  She thought it was urine so she didn't call her primary ob.  Contractions began yesterday morning and she presented after midnight to [facility].     O:   Vitals:    10/24/20 0325 10/24/20 0400 10/24/20 0551 10/24/20 0647   BP: 115/62      Pulse: 113      Resp: 16      Temp: 98.1  F (36.7  C)  98.7  F (37.1  C) 100.3  F (37.9  C)   TempSrc: Oral  Oral    SpO2: 98% 99%  97%     Gen: uncomfortable with contractions  Abd: gravid, soft, non tender unless having a contracttion.   Ext: no edema    BSUS: cephalic, low, anterior placenta.  MVP subjectively appears low/normal, however all pockets with umbilical cord and therefore unable to measure MVP.     SVE: 5-6 cm (hard to tell), soft, +1 station.  Cervix is soft and stretchy.  Small fetal caput noted.         FHTs: BL 170s, min to mod variability.  Small, carrot shaped variable decels.   [place]: contractions q 3 min    A/P: 25 y/o  @ 26 6/7 weeks gestation transferred due to PTL.   - Evidence of probable ROM (unsure when, possibly yesterday?).   - Concern for chorio (fetal and maternal tachycardia, Maternal temp of 100.3)- had been on PCN for GBS ppx.  Add Gent and switch to Amp.   - discontinue indocin as now patient has an indication for delivery.  Will allow labor to progress, may have to augment if not progressing.   - Reviewed the possibility of a needing a c/s if FHT changes remote from delivery, but I am hopeful she will have a vaginal delivery.   - NICU updated.     May Lyons DO FACOG  Maternal Fetal Medicine Specialist  Pager: [phone]  Mobile: [phone]

## 2020-10-24 NOTE — PROVIDER NOTIFICATION
10/24/20 0548   Provider Notification   Provider Name/Title Quique   Method of Notification Phone   Notification Reason Decels       Provider notified of variable decelerations and patient increasing in discomfort from contractions.  Position changes utilized.  Order to give fluid bolus and continue with position changing.  Order for RN to perform SVE: 4/90/+1.

## 2020-10-25 VITALS
WEIGHT: 185 LBS | BODY MASS INDEX: 25.9 KG/M2 | TEMPERATURE: 97.9 F | HEIGHT: 71 IN | OXYGEN SATURATION: 98 % | SYSTOLIC BLOOD PRESSURE: 122 MMHG | HEART RATE: 94 BPM | DIASTOLIC BLOOD PRESSURE: 69 MMHG | RESPIRATION RATE: 16 BRPM

## 2020-10-25 LAB
ERYTHROCYTE [DISTWIDTH] IN BLOOD BY AUTOMATED COUNT: 14.4 % (ref 10–15)
HCT VFR BLD AUTO: 28.3 % (ref 35–47)
HGB BLD-MCNC: 8.8 G/DL (ref 11.7–15.7)
MCH RBC QN AUTO: 25.1 PG (ref 26.5–33)
MCHC RBC AUTO-ENTMCNC: 31.1 G/DL (ref 31.5–36.5)
MCV RBC AUTO: 81 FL (ref 78–100)
PLATELET # BLD AUTO: 218 10E9/L (ref 150–450)
RBC # BLD AUTO: 3.5 10E12/L (ref 3.8–5.2)
WBC # BLD AUTO: 23.7 10E9/L (ref 4–11)

## 2020-10-25 PROCEDURE — 36415 COLL VENOUS BLD VENIPUNCTURE: CPT | Performed by: STUDENT IN AN ORGANIZED HEALTH CARE EDUCATION/TRAINING PROGRAM

## 2020-10-25 PROCEDURE — 250N000013 HC RX MED GY IP 250 OP 250 PS 637: Performed by: STUDENT IN AN ORGANIZED HEALTH CARE EDUCATION/TRAINING PROGRAM

## 2020-10-25 PROCEDURE — 85027 COMPLETE CBC AUTOMATED: CPT | Performed by: STUDENT IN AN ORGANIZED HEALTH CARE EDUCATION/TRAINING PROGRAM

## 2020-10-25 PROCEDURE — 99238 HOSP IP/OBS DSCHRG MGMT 30/<: CPT | Mod: GC | Performed by: OBSTETRICS & GYNECOLOGY

## 2020-10-25 RX ORDER — IBUPROFEN 800 MG/1
800 TABLET, FILM COATED ORAL EVERY 6 HOURS PRN
Qty: 60 TABLET | Refills: 4 | Status: SHIPPED | OUTPATIENT
Start: 2020-10-25 | End: 2022-03-07

## 2020-10-25 RX ORDER — ACETAMINOPHEN 325 MG/1
650 TABLET ORAL EVERY 6 HOURS PRN
Qty: 100 TABLET | Refills: 0 | Status: SHIPPED | OUTPATIENT
Start: 2020-10-25 | End: 2022-03-07

## 2020-10-25 RX ORDER — LEVONORGESTREL/ETHIN.ESTRADIOL 0.1-0.02MG
1 TABLET ORAL DAILY
Qty: 84 TABLET | Refills: 4 | Status: SHIPPED | OUTPATIENT
Start: 2020-10-25 | End: 2022-03-07

## 2020-10-25 RX ORDER — IBUPROFEN 600 MG/1
600 TABLET, FILM COATED ORAL EVERY 6 HOURS PRN
Qty: 60 TABLET | Refills: 0 | Status: SHIPPED | OUTPATIENT
Start: 2020-10-25 | End: 2022-03-07

## 2020-10-25 RX ORDER — AMOXICILLIN 250 MG
1 CAPSULE ORAL DAILY
Qty: 100 TABLET | Refills: 0 | Status: SHIPPED | OUTPATIENT
Start: 2020-10-25 | End: 2022-03-07

## 2020-10-25 RX ADMIN — IBUPROFEN 800 MG: 800 TABLET ORAL at 13:27

## 2020-10-25 RX ADMIN — LABETALOL HYDROCHLORIDE 100 MG: 100 TABLET, FILM COATED ORAL at 08:21

## 2020-10-25 RX ADMIN — ACETAMINOPHEN 650 MG: 325 TABLET, FILM COATED ORAL at 13:28

## 2020-10-25 RX ADMIN — IBUPROFEN 800 MG: 800 TABLET ORAL at 06:45

## 2020-10-25 NOTE — PROGRESS NOTES
[facility]  PEDIATRIC ON-CALL    SOCIAL WORK PROGRESS NOTE      DATA:     Reason for Referral: This  received request to speak with the patient Tony Nova regarding baby Joy and how the NICU works.     INTERVENTION:     This  consulted with the nursing staff and completed a chart review. This  spoke with Ms. Nova via phone. She is hoping to find out more information on how to navigate the NICU, how maternity leave may work with pre-term labor and is concerned with setting up regular communication.     This  explained that the weekend  is on call and may not know all of the up to date information and best answers to her questions. This  agreed to send communication to the social workers assigned to the NICU in order to facilitate quick communication on Monday in order to get Ms. Nova the information she is looking for. This  also explained that Ms. Nova is able to call at any time to get an update on Baby Joy.       PLAN:     Follow up by NICU social workers on 10/26/2020.

## 2020-10-25 NOTE — PLAN OF CARE
Data: Vital signs within normal limits. Postpartum checks within normal limits - see flow record. Patient eating and drinking normally. Patient able to empty bladder independently and is up ambulating. No apparent signs of infection.  Perineum  healing well. Patient performing self cares.  Action: Patient medicated during the shift for pain with Ibuprofen. See MAR. Patient reassessed within 1 hour after each medication and pain was improved - patient stated she was comfortable. Patient education done about pain management and activity. See flow record.  Response: Baby in NICU. Spouse present at bedside.  Plan: Anticipate discharge on 10/25/20.

## 2020-10-25 NOTE — LACTATION NOTE
This note was copied from a baby's chart.  I met with Tony at her [facility] bedside, as there was indication in Joy's chart that she may want to pump. I briefly discussed pumping with mom. She had not yet started pumping. Her postpartum nurse clarified that Tony had not been pumping, and decided to use donor milk then transition to formula. Due to infant being 26weeks, I discussed with neonatologist Iqra Galindo, who will have discussion with mother about benefits of maternal milk. Lactation will follow as needed.      no indicators present

## 2020-10-26 NOTE — PLAN OF CARE
VSS. Perineal pain and headache well controlled with Tylenol and Ibuprofen. Pt declines pumping despite education, information given on suppressing supply. Pt tolerating regular diet, ambulating in room and halls, voiding without difficulty and had two postpartum bowel movements. Postpartum checks WNL. Discharge instructions reviewed with patient and significant other. Both verbalized understanding. Pt discharged to home, accompanied by significant other.

## 2020-11-11 LAB — COPATH REPORT: NORMAL

## 2021-03-06 ENCOUNTER — HEALTH MAINTENANCE LETTER (OUTPATIENT)
Age: 26
End: 2021-03-06

## 2021-03-30 ENCOUNTER — TRANSFERRED RECORDS (OUTPATIENT)
Dept: HEALTH INFORMATION MANAGEMENT | Facility: CLINIC | Age: 26
End: 2021-03-30
Payer: COMMERCIAL

## 2021-04-05 ENCOUNTER — TRANSFERRED RECORDS (OUTPATIENT)
Dept: HEALTH INFORMATION MANAGEMENT | Facility: CLINIC | Age: 26
End: 2021-04-05
Payer: COMMERCIAL

## 2021-10-04 ENCOUNTER — HEALTH MAINTENANCE LETTER (OUTPATIENT)
Age: 26
End: 2021-10-04

## 2021-11-08 ENCOUNTER — TRANSFERRED RECORDS (OUTPATIENT)
Dept: HEALTH INFORMATION MANAGEMENT | Facility: CLINIC | Age: 26
End: 2021-11-08
Payer: COMMERCIAL

## 2021-11-15 ENCOUNTER — ANCILLARY PROCEDURE (OUTPATIENT)
Dept: ULTRASOUND IMAGING | Facility: CLINIC | Age: 26
End: 2021-11-15
Attending: PHYSICIAN ASSISTANT
Payer: COMMERCIAL

## 2021-11-15 ENCOUNTER — MEDICAL CORRESPONDENCE (OUTPATIENT)
Dept: HEALTH INFORMATION MANAGEMENT | Facility: CLINIC | Age: 26
End: 2021-11-15

## 2021-11-15 DIAGNOSIS — R10.33 PERIUMBILICAL ABDOMINAL PAIN: ICD-10-CM

## 2021-11-15 PROCEDURE — 76700 US EXAM ABDOM COMPLETE: CPT | Mod: GC | Performed by: RADIOLOGY

## 2021-11-15 PROCEDURE — 76856 US EXAM PELVIC COMPLETE: CPT | Mod: GC | Performed by: RADIOLOGY

## 2021-11-15 PROCEDURE — 76830 TRANSVAGINAL US NON-OB: CPT | Mod: GC | Performed by: RADIOLOGY

## 2021-11-16 ENCOUNTER — TRANSFERRED RECORDS (OUTPATIENT)
Dept: HEALTH INFORMATION MANAGEMENT | Facility: CLINIC | Age: 26
End: 2021-11-16
Payer: COMMERCIAL

## 2021-11-16 ENCOUNTER — TRANSCRIBE ORDERS (OUTPATIENT)
Dept: OTHER | Age: 26
End: 2021-11-16
Payer: COMMERCIAL

## 2021-11-16 DIAGNOSIS — K80.20 CHOLELITHIASIS: Primary | ICD-10-CM

## 2021-11-16 DIAGNOSIS — R10.32 LLQ ABDOMINAL PAIN: ICD-10-CM

## 2021-11-16 NOTE — TELEPHONE ENCOUNTER
REFERRAL INFORMATION:    Referring Provider:  Treva Morris PA-C    Referring Clinic:  [facility]     Reason for Visit/Diagnosis: Gallstones       FUTURE VISIT INFORMATION:    Appointment Date:bta 11/18/2021    Appointment Time: 3:30 PM      NOTES RECORD STATUS  DETAILS   OFFICE NOTE from Referring Provider Received  11/8/2021 Office visit with Treva Morris PA-C     OFFICE NOTE from Other Specialists Received  4/5/2021, 3/29/2021 Office visit with Lori Sim CNM ([facility])   HOSPITAL DISCHARGE SUMMARY/ ED VISITS  N/A    OPERATIVE REPORT N/A    ENDOSCOPY (EGD)  N/A    PERTINENT LABS Internal    PATHOLOGY REPORTS (RELATED) N/A    IMAGING (CT, MRI, US, XR)  Internal US Abdomen: 11/15/2021  US Pelvic: 11/15/2021     11/16/2021 9:02am Fax request sent to [facility] for med recs. -Claire     11/17/2021 10:43am Received recs from [facility]; sent to scan. A copy was placed in MD's folder in Right Fax. -Claire     11/17/2021 1:20pm Fax request sent to [facility] for med recs. Giovanny

## 2021-11-18 ENCOUNTER — PRE VISIT (OUTPATIENT)
Dept: SURGERY | Facility: CLINIC | Age: 26
End: 2021-11-18

## 2021-11-18 ASSESSMENT — ENCOUNTER SYMPTOMS
JAUNDICE: 0
HEARTBURN: 1
HOT FLASHES: 0
DECREASED LIBIDO: 1
VOMITING: 0
BLOOD IN STOOL: 0
DIARRHEA: 0
NAUSEA: 1
CONSTIPATION: 1
RECTAL PAIN: 0
BOWEL INCONTINENCE: 0
ABDOMINAL PAIN: 1
BLOATING: 1

## 2021-11-26 ENCOUNTER — PATIENT OUTREACH (OUTPATIENT)
Dept: SURGERY | Facility: CLINIC | Age: 26
End: 2021-11-26
Payer: COMMERCIAL

## 2021-11-26 NOTE — PROGRESS NOTES
Patient Telephone Reminder Call    Date of call:  11/26/21  Phone numbers:  Home number on file [phone] (home)    Reached patient/confirmed appointment:  No - left message:   on voicemail  Appointment with:   Dr. Walt Avilez  Reason for visit:  Gallbladder consult

## 2021-11-29 ENCOUNTER — ANCILLARY PROCEDURE (OUTPATIENT)
Dept: CT IMAGING | Facility: CLINIC | Age: 26
End: 2021-11-29
Attending: SURGERY
Payer: COMMERCIAL

## 2021-11-29 ENCOUNTER — OFFICE VISIT (OUTPATIENT)
Dept: SURGERY | Facility: CLINIC | Age: 26
End: 2021-11-29
Attending: PHYSICIAN ASSISTANT
Payer: COMMERCIAL

## 2021-11-29 VITALS
SYSTOLIC BLOOD PRESSURE: 115 MMHG | WEIGHT: 168.2 LBS | DIASTOLIC BLOOD PRESSURE: 77 MMHG | BODY MASS INDEX: 35.3 KG/M2 | HEART RATE: 97 BPM | HEIGHT: 58 IN | OXYGEN SATURATION: 98 %

## 2021-11-29 DIAGNOSIS — R52 PAIN: ICD-10-CM

## 2021-11-29 DIAGNOSIS — R52 PAIN: Primary | ICD-10-CM

## 2021-11-29 PROCEDURE — 99214 OFFICE O/P EST MOD 30 MIN: CPT | Performed by: SURGERY

## 2021-11-29 PROCEDURE — 74177 CT ABD & PELVIS W/CONTRAST: CPT | Performed by: STUDENT IN AN ORGANIZED HEALTH CARE EDUCATION/TRAINING PROGRAM

## 2021-11-29 RX ORDER — IOPAMIDOL 755 MG/ML
103 INJECTION, SOLUTION INTRAVASCULAR ONCE
Status: COMPLETED | OUTPATIENT
Start: 2021-11-29 | End: 2021-11-29

## 2021-11-29 RX ADMIN — IOPAMIDOL 103 ML: 755 INJECTION, SOLUTION INTRAVASCULAR at 15:35

## 2021-11-29 ASSESSMENT — PAIN SCALES - GENERAL: PAINLEVEL: NO PAIN (0)

## 2021-11-29 ASSESSMENT — MIFFLIN-ST. JEOR: SCORE: 1379.76

## 2021-11-29 NOTE — PATIENT INSTRUCTIONS
You met with Dr. Walt Avilez.      Today's visit instructions:    Dr. Avilez has placed an order for CT scan. Please stop out at the  after your appointment today to schedule this. We will call you with those results.        If you have questions please contact Yolanda RN or Bella RN during regular clinic hours, Monday through Friday 7:30 AM - 4:00 PM, or you can contact us via [employer] at anytime.       If you have urgent needs after-hours, weekends, or holidays please call the hospital at [phone] and ask to speak with our on-call General Surgery Team.    Appointment [phone], option #1   Nurse Advice (Yolanda or Bella): [phone]   Surgery Scheduler (Chris): [phone]  Fax: [phone]

## 2021-11-29 NOTE — PROGRESS NOTES
"Patient with intermittent LLQ pain usually after meals. Denies any biliary symptoms. No significant change in bowel habits.  No family history of biliary disease.  Was found to have gallstones during work up. No further work up done.  Works desk job, lives with family.  Past Medical History:   Diagnosis Date     Chronic hypertension      Hypertension      PCOS (polycystic ovarian syndrome)      Past Surgical History:   Procedure Laterality Date     ENDOMETRIAL BIOPSY       PHYSICAL EXAM  General appearance- healthy, alert, and in no distress.  Skin- Skin color and turgor normal.  No obvious rashes.  Neck- Neck is supple without obvious adenopathy.  Lungs- Respiratory effort unlabored.  Gait- Normal.  BMI 35  Abdomen - soft non distended, non tender without obvious masses.    Impression: gallstones unlikely cause of LLQ pain.  Would work up LLQ with CT.  Will call patient with result and refer to specialist pending findings on CT.    \"Total time = 30 minutes, spent on the date of encounter doing chart review, history and physical, documentation, patient education, and any further activity as noted above.           "

## 2021-11-29 NOTE — NURSING NOTE
"Chief Complaint   Patient presents with     New Patient     Possible gallstones       Vitals:    11/29/21 1429   BP: 115/77   BP Location: Left arm   Patient Position: Sitting   Cuff Size: Adult Regular   Pulse: 97   SpO2: 98%   Weight: 76.3 kg (168 lb 3.2 oz)   Height: 1.461 m (4' 9.5\")       Body mass index is 35.77 kg/m .                          Janiya Rich, EMT    "

## 2021-11-30 ENCOUNTER — PATIENT OUTREACH (OUTPATIENT)
Dept: SURGERY | Facility: CLINIC | Age: 26
End: 2021-11-30
Payer: COMMERCIAL

## 2021-11-30 DIAGNOSIS — R52 PAIN: Primary | ICD-10-CM

## 2021-11-30 DIAGNOSIS — R10.32 LLQ ABDOMINAL PAIN: ICD-10-CM

## 2021-11-30 NOTE — PROGRESS NOTES
Dr. Avilez called patient to discuss results of CT scan. He recommends that patient be seen by GI to evaluate LLQ abdominal pain. Dr. Avilez does not believe that gallstones are causing this pain. Patient verbalizes understanding. Referral to GI placed.

## 2022-01-19 ENCOUNTER — HOSPITAL ENCOUNTER (EMERGENCY)
Facility: CLINIC | Age: 27
Discharge: HOME | End: 2022-01-19
Attending: EMERGENCY MEDICINE | Admitting: EMERGENCY MEDICINE
Payer: COMMERCIAL

## 2022-01-19 ENCOUNTER — APPOINTMENT (OUTPATIENT)
Dept: RADIOLOGY | Facility: CLINIC | Age: 27
End: 2022-01-19
Attending: EMERGENCY MEDICINE
Payer: COMMERCIAL

## 2022-01-19 VITALS
BODY MASS INDEX: 35.6 KG/M2 | DIASTOLIC BLOOD PRESSURE: 71 MMHG | HEIGHT: 57 IN | OXYGEN SATURATION: 99 % | WEIGHT: 165 LBS | TEMPERATURE: 97.7 F | SYSTOLIC BLOOD PRESSURE: 111 MMHG | HEART RATE: 73 BPM | RESPIRATION RATE: 18 BRPM

## 2022-01-19 DIAGNOSIS — R07.9 CHEST PAIN, UNSPECIFIED TYPE: ICD-10-CM

## 2022-01-19 DIAGNOSIS — R10.13 ABDOMINAL PAIN, EPIGASTRIC: ICD-10-CM

## 2022-01-19 DIAGNOSIS — R10.13 DYSPEPSIA: ICD-10-CM

## 2022-01-19 LAB
ALBUMIN SERPL-MCNC: 3.8 G/DL (ref 3.5–5)
ALP SERPL-CCNC: 57 U/L (ref 45–120)
ALT SERPL W P-5'-P-CCNC: 25 U/L (ref 0–45)
ANION GAP SERPL CALCULATED.3IONS-SCNC: 9 MMOL/L (ref 5–18)
AST SERPL W P-5'-P-CCNC: 16 U/L (ref 0–40)
ATRIAL RATE - MUSE: 64 BPM
BASOPHILS # BLD AUTO: 0.1 10E3/UL (ref 0–0.2)
BASOPHILS NFR BLD AUTO: 1 %
BILIRUB DIRECT SERPL-MCNC: 0.2 MG/DL
BILIRUB SERPL-MCNC: 0.5 MG/DL (ref 0–1)
BUN SERPL-MCNC: 12 MG/DL (ref 8–22)
CALCIUM SERPL-MCNC: 9.1 MG/DL (ref 8.5–10.5)
CHLORIDE BLD-SCNC: 101 MMOL/L (ref 98–107)
CO2 SERPL-SCNC: 26 MMOL/L (ref 22–31)
CREAT SERPL-MCNC: 0.77 MG/DL (ref 0.6–1.1)
D DIMER PPP FEU-MCNC: 0.36 UG/ML FEU (ref 0–0.5)
DIASTOLIC BLOOD PRESSURE - MUSE: NORMAL MMHG
EOSINOPHIL # BLD AUTO: 0 10E3/UL (ref 0–0.7)
EOSINOPHIL NFR BLD AUTO: 0 %
ERYTHROCYTE [DISTWIDTH] IN BLOOD BY AUTOMATED COUNT: 12.8 % (ref 10–15)
GFR SERPL CREATININE-BSD FRML MDRD: >90 ML/MIN/1.73M2
GLUCOSE BLD-MCNC: 106 MG/DL (ref 70–125)
HCG UR QL: NEGATIVE
HCT VFR BLD AUTO: 40 % (ref 35–47)
HGB BLD-MCNC: 12.4 G/DL (ref 11.7–15.7)
IMM GRANULOCYTES # BLD: 0 10E3/UL
IMM GRANULOCYTES NFR BLD: 0 %
INTERNAL QC OK POCT: NORMAL
INTERPRETATION ECG - MUSE: NORMAL
LIPASE SERPL-CCNC: 16 U/L (ref 0–52)
LYMPHOCYTES # BLD AUTO: 1.2 10E3/UL (ref 0.8–5.3)
LYMPHOCYTES NFR BLD AUTO: 13 %
MCH RBC QN AUTO: 24.6 PG (ref 26.5–33)
MCHC RBC AUTO-ENTMCNC: 31 G/DL (ref 31.5–36.5)
MCV RBC AUTO: 79 FL (ref 78–100)
MONOCYTES # BLD AUTO: 0.4 10E3/UL (ref 0–1.3)
MONOCYTES NFR BLD AUTO: 5 %
NEUTROPHILS # BLD AUTO: 7.4 10E3/UL (ref 1.6–8.3)
NEUTROPHILS NFR BLD AUTO: 81 %
NRBC # BLD AUTO: 0 10E3/UL
NRBC BLD AUTO-RTO: 0 /100
P AXIS - MUSE: 55 DEGREES
PLATELET # BLD AUTO: 253 10E3/UL (ref 150–450)
POCT KIT EXPIRATION DATE: NORMAL
POCT KIT LOT NUMBER: NORMAL
POTASSIUM BLD-SCNC: 3.8 MMOL/L (ref 3.5–5)
PR INTERVAL - MUSE: 164 MS
PROT SERPL-MCNC: 7.7 G/DL (ref 6–8)
QRS DURATION - MUSE: 78 MS
QT - MUSE: 398 MS
QTC - MUSE: 410 MS
R AXIS - MUSE: 44 DEGREES
RBC # BLD AUTO: 5.05 10E6/UL (ref 3.8–5.2)
SODIUM SERPL-SCNC: 136 MMOL/L (ref 136–145)
SYSTOLIC BLOOD PRESSURE - MUSE: NORMAL MMHG
T AXIS - MUSE: 2 DEGREES
TROPONIN I SERPL-MCNC: <0.01 NG/ML (ref 0–0.29)
VENTRICULAR RATE- MUSE: 64 BPM
WBC # BLD AUTO: 9.1 10E3/UL (ref 4–11)

## 2022-01-19 PROCEDURE — 99285 EMERGENCY DEPT VISIT HI MDM: CPT | Mod: 25

## 2022-01-19 PROCEDURE — 82248 BILIRUBIN DIRECT: CPT | Performed by: EMERGENCY MEDICINE

## 2022-01-19 PROCEDURE — 93005 ELECTROCARDIOGRAM TRACING: CPT | Performed by: EMERGENCY MEDICINE

## 2022-01-19 PROCEDURE — 96361 HYDRATE IV INFUSION ADD-ON: CPT

## 2022-01-19 PROCEDURE — 83690 ASSAY OF LIPASE: CPT | Performed by: EMERGENCY MEDICINE

## 2022-01-19 PROCEDURE — 250N000013 HC RX MED GY IP 250 OP 250 PS 637: Performed by: EMERGENCY MEDICINE

## 2022-01-19 PROCEDURE — 85025 COMPLETE CBC W/AUTO DIFF WBC: CPT | Performed by: EMERGENCY MEDICINE

## 2022-01-19 PROCEDURE — 82310 ASSAY OF CALCIUM: CPT | Performed by: EMERGENCY MEDICINE

## 2022-01-19 PROCEDURE — 36415 COLL VENOUS BLD VENIPUNCTURE: CPT | Performed by: EMERGENCY MEDICINE

## 2022-01-19 PROCEDURE — 258N000003 HC RX IP 258 OP 636: Performed by: EMERGENCY MEDICINE

## 2022-01-19 PROCEDURE — 250N000009 HC RX 250: Performed by: EMERGENCY MEDICINE

## 2022-01-19 PROCEDURE — 85379 FIBRIN DEGRADATION QUANT: CPT | Performed by: EMERGENCY MEDICINE

## 2022-01-19 PROCEDURE — 96374 THER/PROPH/DIAG INJ IV PUSH: CPT

## 2022-01-19 PROCEDURE — 71045 X-RAY EXAM CHEST 1 VIEW: CPT

## 2022-01-19 PROCEDURE — 81025 URINE PREGNANCY TEST: CPT | Performed by: EMERGENCY MEDICINE

## 2022-01-19 PROCEDURE — 84484 ASSAY OF TROPONIN QUANT: CPT | Performed by: EMERGENCY MEDICINE

## 2022-01-19 RX ORDER — FAMOTIDINE 20 MG/1
20 TABLET, FILM COATED ORAL 2 TIMES DAILY
Qty: 30 TABLET | Refills: 0 | Status: SHIPPED | OUTPATIENT
Start: 2022-01-19 | End: 2022-03-07

## 2022-01-19 RX ADMIN — LIDOCAINE HYDROCHLORIDE 30 ML: 20 SOLUTION ORAL; TOPICAL at 07:45

## 2022-01-19 RX ADMIN — SODIUM CHLORIDE 1000 ML: 9 INJECTION, SOLUTION INTRAVENOUS at 07:41

## 2022-01-19 RX ADMIN — FAMOTIDINE 20 MG: 10 INJECTION, SOLUTION INTRAVENOUS at 07:39

## 2022-01-19 ASSESSMENT — ENCOUNTER SYMPTOMS
VOMITING: 1
DIARRHEA: 0
SORE THROAT: 0
BACK PAIN: 1
DYSURIA: 0
ABDOMINAL PAIN: 1
VOICE CHANGE: 1
NAUSEA: 1
CHILLS: 0
HEADACHES: 0
ROS GI COMMENTS: NEGATIVE FOR HEMATEMESIS.
SHORTNESS OF BREATH: 1
FEVER: 0
HEMATURIA: 0

## 2022-01-19 ASSESSMENT — HEART SCORE
HISTORY: SLIGHTLY SUSPICIOUS
TROPONIN: LESS THAN OR EQUAL TO NORMAL LIMIT
AGE: <45

## 2022-01-19 ASSESSMENT — MIFFLIN-ST. JEOR: SCORE: 1352.32

## 2022-01-19 NOTE — DISCHARGE INSTRUCTIONS
Read and follow the discharge instructions.    Take Pepcid as instructed    Call your doctor today to make a follow up appointment     Return or call 911 if you are vomiting blood or have worsening symptoms shortness of breath or any other concerns.

## 2022-01-19 NOTE — ED TRIAGE NOTES
Mid chest pain and burning radiating to lower back since around 2200 yesterday. Mid abd pain and nausea as well. COVID+ 1/11.

## 2022-01-19 NOTE — ED PROVIDER NOTES
EMERGENCY DEPARTMENT ENCOUNTER      NAME: Tony Nova  AGE: 26 year old female  YOB: 1995  MRN: [id number]  EVALUATION DATE & TIME: 1/19/2022  6:39 AM    PCP: Tiana Sifuentes Md    ED PROVIDER: Cynthia Hoffman M.D.      CHIEF COMPLAINT     Chief Complaint   Patient presents with     Chest Pain         FINAL IMPRESSION:     1. Chest pain, unspecified type    2. Abdominal pain, epigastric    3. Dyspepsia          MEDICAL DECISION MAKING:       Pertinent Labs & Imaging studies reviewed. (See chart for details)    26 year old female presents to the Emergency Department for evaluation of chest pain.     ED Course as of 01/19/22 0951   Wed Jan 19, 2022   0649 26-year-old female with past medical history significant for cystic ovarian syndrome presents complaining of anterior chest pain.   0649 Chest pain is anteriorly located.  Described as a pressure.  He is goes up to the neck.  Feels burning-like.  No burning sensation in her throat but she said her voice has changed.   0649 She admits to nausea and vomiting no hematemesis no diarrhea no dysuria no hematuria no dysuria no rashes.   0649 Has some pain on the left lower quadrant.  No complaints of back pain.   0649 Pain is worse when she lays flat and better when she sits up.  Started at 10 PM last night.  Took antiacid medications and did not help.  She says she is being up since then.  Called the nursing line and told her to come here for evaluation.   0650 Patient has been vaccinated and boosted but tested positive for COVID on January 11.  She says she only had a sore throat then and since then the symptoms have gone away.   0650 Denies smoke alcohol no illegal drugs no other coronary disease risk factors.   0650 On examination nontoxic cooperative and pleasant in no distress.  A little hoarseness of the voice but no tripoding.   0650 Epigastric tenderness palpation anterior chest wall tenderness palpation.  Abdomen is soft there is no guarding or  rebound no peritoneal signs.   0650 Differential diagnoses include but not limited to GERD, biliary etiology, pancreatitis, perforation, H. pylori.  IV coronary syndrome PE ectopic among others.   0650 We will obtain an EKG.  We will patient recently assess given the worsening symptoms when she lays flat and the burning sensation on her chest.  We will obtain a pregnancy test given recent COVID we will do a D-dimer   0656 US done on 11/15 shows cholelithiasis.  CT shows cholelithiasis no cholecystitis.   0752 Lab work negative troponin normal D-dimer normal hemoglobin white blood cell count normal liver function test.  Patient updated.   0753 Potassium: 3.8   0757 Patient states she is feeling much better after antiacid medication.  Repeat abdominal examination no guarding or rebound no peritoneal signs.   0859 Reevaluated.  Discussed results.  She feels comfortable going home.  Discussed with her follow-up primary care doctor for possible H. pylori testing.  She is otherwise young this is her first episode therefore GI can be consulted after she sees her primary care doctor.  She was given strict discharge instructions and return precautions.   0904 Clinical impression and decision making 26-year-old female presents complaining of epigastric abdominal pain and chest pain described as pressure burning-like.    Worse with laying flat better with sitting.  Present since yesterday continues.  No risk factors for coronary disease.  Pain is reproducible both epigastric and chest pain.  EKG and troponin negative.  Recent diagnosis of COVID she is vaccinated D-dimer negative.  She is not hypoxic tachycardic objectively therefore I think pulmonary embolism is less likely.     0905 Given the radiation of the pain they change in the voice I think this is concerning for gastric pathology.   0905 No history of gallstones no right upper quadrant tenderness palpation normal white blood count normal liver function test normal  lipase   0906 Pain better after antiacid medication.   0906 She has some left lower quadrant abdominal pain benign abdominal exam there is no guarding or rebound.  Pregnancy test is negative.   0906 Has no urinary symptoms.   0906 Other Etiologies such as dissection were entertained.  Patient has no risk factors mediastinum normal on chest x-ray.   0906 Discussed with her how to use Pepcid recommended call primary care doctor she might benefit from being tested for H. pylori.  Eventually if her symptoms did not improve with antacid she can be referred to GI.  Patient and agreement with this plan discharge bilaterally stable condition.       Vital Signs: Hypertensive  EKG: Normal sinus  Imaging:cxr no acute   Home Meds: Reviewed  ED meds/abx: Pepcid GI cocktail  Fluids: Normal saline    Labs  K 3.8  Cr 0.77  Wbc 9.1  Hgb 12.4  Platelets 253  Troponin and D-dimer negative  Liver function tests negative  Lipase 16    Review of Previous Records  Office visit on 11/29/21  Progress notes  Patient with intermittent LLQ pain usually after meals. Denies any biliary symptoms. No significant change in bowel habits.  No family history of biliary disease.  Was found to have gallstones during work up. No further work up done.  Works desk job, lives with family.    CT abdomen pelvis with contrast on 11/29/21  IMPRESSION:  1.  Mild gastric distention with ingested material. If there is  concern for delayed gastric emptying, may consider scintigraphic  gastric emptying study.  2.  Cholelithiasis without evidence of acute cholecystitis.    Consults      ED COURSE   6:41 AM I met with the patient to gather history and to perform my initial exam. I discussed the plan for care while in the Emergency Department. PPE (gloves, eye protection, face shield, surgical cap, N95 mask, and surgical mask) was worn by me during patient encounters while patient wore mask.     7:54 AM reevaluated and updated    8:55 AM reevaluated and updated    At  the conclusion of the encounter I discussed the results of all of the tests and the disposition. The questions were answered. The patient acknowledged understanding and was agreeable with the care plan.         MEDICATIONS GIVEN IN THE EMERGENCY:     Medications   0.9% sodium chloride BOLUS (1,000 mLs Intravenous New Bag 1/19/22 1141)   famotidine (PEPCID) injection 20 mg (20 mg Intravenous Given 1/19/22 0739)   lidocaine (viscous) (XYLOCAINE) 2 % 15 mL, alum & mag hydroxide-simethicone (MAALOX) 15 mL GI Cocktail (30 mLs Intradermal Given 1/19/22 8256)       NEW PRESCRIPTIONS STARTED AT TODAY'S ER VISIT     New Prescriptions    FAMOTIDINE (PEPCID) 20 MG TABLET    Take 1 tablet (20 mg) by mouth 2 times daily          =================================================================    HPI     Patient information was obtained from: Patient    Use of : N/A         Tony Avtar is a 26 year old female with a history of HTN and PCOS who presents by walk in for evaluation of chest pain.     Patient reports a sudden onset of central chest pain at 2200 last night that has since persisted. She describes pain as a burning and pressure and notes it radiates upwards. Pain is worse with laying down and improves with sitting up. Patient endorses difficulty talking secondary to pain and notes a voice change but denies throat pain. She initially took Pepsid, omeprazole, and TUMS without any relief. Patient denies eating any unusual food yesterday. She has had associated shortness of breath, nausea, 3 episodes of emesis, cramping lower back pain, and left lower abdominal pain. Denies hematemesis or a bitter taste. No history of similar symptoms.     Of note, patient tested positive for COVID on 1/11/22 (8 days ago) after experiencing a sore throat. Denies any additional symptoms. She is vaccinated for COVID x3 and received her booster last month.     Patient any recent travel or extended periods of sitting. She denies a  chance of pregnancy. No history of prior abdominal surgeries.    Patient reports having recent headaches but denies any headache currently. She otherwise denies diarrhea, dysuria, hematuria, fever, chills, vision changes, congestion, sore throat, or additional medical concerns or complaints at this time.      ScHx: Nonsmoker, denies alcohol or drug use.    REVIEW OF SYSTEMS   Review of Systems   Constitutional: Negative for chills and fever.   HENT: Positive for voice change. Negative for congestion and sore throat.         Negative for bitter taste.   Eyes: Negative for visual disturbance.   Respiratory: Positive for shortness of breath.    Cardiovascular: Positive for chest pain (central; burning and pressure).   Gastrointestinal: Positive for abdominal pain (LLQ), nausea and vomiting (x3). Negative for diarrhea.        Negative for hematemesis.    Genitourinary: Negative for dysuria and hematuria.   Musculoskeletal: Positive for back pain (lower; cramping).   Neurological: Negative for headaches.   All other systems reviewed and are negative.     PAST MEDICAL HISTORY:     Past Medical History:   Diagnosis Date     Chronic hypertension      Hypertension      PCOS (polycystic ovarian syndrome)        PAST SURGICAL HISTORY:     Past Surgical History:   Procedure Laterality Date     ENDOMETRIAL BIOPSY           CURRENT MEDICATIONS:   famotidine (PEPCID) 20 MG tablet  acetaminophen (TYLENOL) 325 MG tablet  ferrous sulfate (FE TABS) 325 (65 Fe) MG EC tablet  ibuprofen (ADVIL/MOTRIN) 600 MG tablet  ibuprofen (ADVIL/MOTRIN) 800 MG tablet  labetalol (NORMODYNE) 100 MG tablet  levonorgestrel-ethinyl estradiol (AVIANE) 0.1-20 MG-MCG tablet  Prenatal Vit-Fe Fumarate-FA (PRENATAL MULTIVITAMIN W/IRON) 27-0.8 MG tablet  senna-docusate (SENOKOT-S/PERICOLACE) 8.6-50 MG tablet         ALLERGIES:   No Known Allergies    FAMILY HISTORY:   History reviewed. No pertinent family history.    SOCIAL HISTORY:     Social History  "    Socioeconomic History     Marital status: Single     Spouse name: Not on file     Number of children: Not on file     Years of education: Not on file     Highest education level: Not on file   Occupational History     Not on file   Tobacco Use     Smoking status: Never Smoker     Smokeless tobacco: Never Used   Substance and Sexual Activity     Alcohol use: Never     Drug use: Never     Sexual activity: Yes     Partners: Male   Other Topics Concern     Not on file   Social History Narrative     Not on file     Social Determinants of Health     Financial Resource Strain: Not on file   Food Insecurity: Not on file   Transportation Needs: Not on file   Physical Activity: Not on file   Stress: Not on file   Social Connections: Not on file   Intimate Partner Violence: Not on file   Housing Stability: Not on file       VITALS:   /85   Pulse 70   Temp 97.9  F (36.6  C) (Oral)   Resp 17   Ht 1.448 m (4' 9\")   Wt 74.8 kg (165 lb)   LMP 11/14/2021 (Within Weeks)   SpO2 99%   BMI 35.71 kg/m      PHYSICAL EXAM     Physical Exam    Physical Exam   Constitutional: Well appearing, nontoxic, cooperative and pleasant.     Head: Atraumatic.     Nose: Nose normal.     Mouth/Throat: Oropharynx is clear and moist.     Eyes: EOM are normal. Pupils are equal, round, and reactive to light.     Ears: Bilateral pearly white TMs.    Neck: Normal range of motion. Neck supple.     Cardiovascular: Normal rate, regular rhythm and normal heart sounds.      Pulmonary/Chest: Normal effort  and breath sounds normal. Anterior chest tenderness to palpation.    Abdominal: Soft. Bowel sounds are normal. Epigastric tenderness to palpation.     Musculoskeletal: Bilateral lower back tenderness to palpation, no midline tenderness to palpation. Normal range of motion.     Neurological: No deficits    Lymphatics: No edema.    Skin: Skin is warm and dry.     Psychiatric: Normal mood and affect. Behavior is normal.       LAB:     All pertinent " labs reviewed and interpreted.  Labs Ordered and Resulted from Time of ED Arrival to Time of ED Departure   CBC WITH PLATELETS AND DIFFERENTIAL - Abnormal       Result Value    WBC Count 9.1      RBC Count 5.05      Hemoglobin 12.4      Hematocrit 40.0      MCV 79      MCH 24.6 (*)     MCHC 31.0 (*)     RDW 12.8      Platelet Count 253      % Neutrophils 81      % Lymphocytes 13      % Monocytes 5      % Eosinophils 0      % Basophils 1      % Immature Granulocytes 0      NRBCs per 100 WBC 0      Absolute Neutrophils 7.4      Absolute Lymphocytes 1.2      Absolute Monocytes 0.4      Absolute Eosinophils 0.0      Absolute Basophils 0.1      Absolute Immature Granulocytes 0.0      Absolute NRBCs 0.0     BASIC METABOLIC PANEL - Normal    Sodium 136      Potassium 3.8      Chloride 101      Carbon Dioxide (CO2) 26      Anion Gap 9      Urea Nitrogen 12      Creatinine 0.77      Calcium 9.1      Glucose 106      GFR Estimate >90     HEPATIC FUNCTION PANEL - Normal    Bilirubin Total 0.5      Bilirubin Direct 0.2      Protein Total 7.7      Albumin 3.8      Alkaline Phosphatase 57      AST 16      ALT 25     TROPONIN I - Normal    Troponin I <0.01     D DIMER QUANTITATIVE - Normal    D-Dimer Quantitative 0.36     LIPASE - Normal    Lipase 16     HCG QUALITATIVE URINE POCT - Normal    HCG Qual Urine Negative      Internal QC Check POCT Valid      POCT Kit Lot Number [id number]      POCT Kit Expiration Date 2023-03-31          RADIOLOGY:     Reviewed all pertinent imaging. Please see official radiology report.  XR Chest Port 1 View   Final Result   IMPRESSION: Lungs are clear. No pleural effusion. Heart size and pulmonary vascularity within normal limits.           EKG:     EKG #1  Sinus rhythm normal anterior progression normal axis    Time:[id number]    Ventricular rate 64 bmp  Axis normal  MN interval 164 ms  QRS duration 78 ms  QT//410 ms    Compared to previous EKG on no previous available for comparison  I have  independently reviewed and interpreted the EKG(s) documented above.      PROCEDURES:     Procedures      I, Melody Villaseñor, am serving as a scribe to document services personally performed by Dr. Hoffman based on my observation and the provider's statements to me. I, Cynthia Hoffman MD attest that Melody Villaseñor is acting in a scribe capacity, has observed my performance of the services and has documented them in accordance with my direction.    Cynthia Hoffman M.D.  Emergency Medicine  [facility] EMERGENCY ROOM  [address] [address]  [phone]  Dept: [phone]     Cynthia Hoffman MD  01/19/22 0951

## 2022-01-19 NOTE — ED NOTES
Patient reports sob with exertion but her sats are 99% on room air. Complaining of mid epigastric burning famotidine, and GI cocktail administered. Also running a bolus of NS 1 liter. Plan for chest x-ray she is marked ready for rad.  Salomon Adams RN  1/19/2022  8:28 AM

## 2022-01-19 NOTE — ED NOTES
Patient discharged to home. Discharge instructions reviewed and signed by the patient. All personal belongings removed from the room.  Salomon Adams RN  1/19/2022  10:28 AM

## 2022-03-03 ENCOUNTER — HOSPITAL ENCOUNTER (EMERGENCY)
Facility: CLINIC | Age: 27
Discharge: HOME | End: 2022-03-03
Attending: EMERGENCY MEDICINE | Admitting: EMERGENCY MEDICINE
Payer: COMMERCIAL

## 2022-03-03 ENCOUNTER — APPOINTMENT (OUTPATIENT)
Dept: ULTRASOUND IMAGING | Facility: CLINIC | Age: 27
End: 2022-03-03
Attending: EMERGENCY MEDICINE
Payer: COMMERCIAL

## 2022-03-03 VITALS
WEIGHT: 165 LBS | TEMPERATURE: 97.9 F | DIASTOLIC BLOOD PRESSURE: 74 MMHG | OXYGEN SATURATION: 98 % | HEIGHT: 57 IN | BODY MASS INDEX: 35.6 KG/M2 | HEART RATE: 66 BPM | SYSTOLIC BLOOD PRESSURE: 131 MMHG | RESPIRATION RATE: 17 BRPM

## 2022-03-03 DIAGNOSIS — R07.89 ATYPICAL CHEST PAIN: ICD-10-CM

## 2022-03-03 DIAGNOSIS — K80.20 CALCULUS OF GALLBLADDER WITHOUT CHOLECYSTITIS WITHOUT OBSTRUCTION: ICD-10-CM

## 2022-03-03 DIAGNOSIS — K80.50 BILIARY COLIC: ICD-10-CM

## 2022-03-03 LAB
ALBUMIN SERPL-MCNC: 3.9 G/DL (ref 3.5–5)
ALBUMIN UR-MCNC: NEGATIVE MG/DL
ALP SERPL-CCNC: 56 U/L (ref 45–120)
ALT SERPL W P-5'-P-CCNC: 18 U/L (ref 0–45)
AMORPH CRY #/AREA URNS HPF: ABNORMAL /HPF
ANION GAP SERPL CALCULATED.3IONS-SCNC: 13 MMOL/L (ref 5–18)
APPEARANCE UR: ABNORMAL
AST SERPL W P-5'-P-CCNC: 18 U/L (ref 0–40)
BASOPHILS # BLD AUTO: 0 10E3/UL (ref 0–0.2)
BASOPHILS NFR BLD AUTO: 1 %
BILIRUB SERPL-MCNC: 0.4 MG/DL (ref 0–1)
BILIRUB UR QL STRIP: NEGATIVE
BUN SERPL-MCNC: 13 MG/DL (ref 8–22)
CALCIUM SERPL-MCNC: 9.8 MG/DL (ref 8.5–10.5)
CHLORIDE BLD-SCNC: 103 MMOL/L (ref 98–107)
CO2 SERPL-SCNC: 25 MMOL/L (ref 22–31)
COLOR UR AUTO: YELLOW
CREAT SERPL-MCNC: 0.82 MG/DL (ref 0.6–1.1)
EOSINOPHIL # BLD AUTO: 0 10E3/UL (ref 0–0.7)
EOSINOPHIL NFR BLD AUTO: 1 %
ERYTHROCYTE [DISTWIDTH] IN BLOOD BY AUTOMATED COUNT: 12.9 % (ref 10–15)
GFR SERPL CREATININE-BSD FRML MDRD: >90 ML/MIN/1.73M2
GLUCOSE BLD-MCNC: 115 MG/DL (ref 70–125)
GLUCOSE UR STRIP-MCNC: NEGATIVE MG/DL
HCG UR QL: NEGATIVE
HCT VFR BLD AUTO: 41.2 % (ref 35–47)
HGB BLD-MCNC: 12.7 G/DL (ref 11.7–15.7)
HGB UR QL STRIP: NEGATIVE
IMM GRANULOCYTES # BLD: 0 10E3/UL
IMM GRANULOCYTES NFR BLD: 0 %
INTERNAL QC OK POCT: NORMAL
KETONES UR STRIP-MCNC: NEGATIVE MG/DL
LEUKOCYTE ESTERASE UR QL STRIP: NEGATIVE
LIPASE SERPL-CCNC: 27 U/L (ref 0–52)
LYMPHOCYTES # BLD AUTO: 1.3 10E3/UL (ref 0.8–5.3)
LYMPHOCYTES NFR BLD AUTO: 17 %
MCH RBC QN AUTO: 24.1 PG (ref 26.5–33)
MCHC RBC AUTO-ENTMCNC: 30.8 G/DL (ref 31.5–36.5)
MCV RBC AUTO: 78 FL (ref 78–100)
MONOCYTES # BLD AUTO: 0.5 10E3/UL (ref 0–1.3)
MONOCYTES NFR BLD AUTO: 6 %
NEUTROPHILS # BLD AUTO: 6 10E3/UL (ref 1.6–8.3)
NEUTROPHILS NFR BLD AUTO: 75 %
NITRATE UR QL: NEGATIVE
NRBC # BLD AUTO: 0 10E3/UL
NRBC BLD AUTO-RTO: 0 /100
PH UR STRIP: 7.5 [PH] (ref 5–7)
PLATELET # BLD AUTO: 243 10E3/UL (ref 150–450)
POCT KIT EXPIRATION DATE: NORMAL
POCT KIT LOT NUMBER: NORMAL
POTASSIUM BLD-SCNC: 3.9 MMOL/L (ref 3.5–5)
PROT SERPL-MCNC: 7.8 G/DL (ref 6–8)
RBC # BLD AUTO: 5.27 10E6/UL (ref 3.8–5.2)
RBC URINE: 0 /HPF
SODIUM SERPL-SCNC: 141 MMOL/L (ref 136–145)
SP GR UR STRIP: 1.02 (ref 1–1.03)
SQUAMOUS EPITHELIAL: 5 /HPF
TROPONIN I SERPL-MCNC: <0.01 NG/ML (ref 0–0.29)
UROBILINOGEN UR STRIP-MCNC: <2 MG/DL
WBC # BLD AUTO: 7.9 10E3/UL (ref 4–11)
WBC URINE: 0 /HPF

## 2022-03-03 PROCEDURE — 76705 ECHO EXAM OF ABDOMEN: CPT

## 2022-03-03 PROCEDURE — 83690 ASSAY OF LIPASE: CPT | Performed by: EMERGENCY MEDICINE

## 2022-03-03 PROCEDURE — 85025 COMPLETE CBC W/AUTO DIFF WBC: CPT | Performed by: EMERGENCY MEDICINE

## 2022-03-03 PROCEDURE — 96374 THER/PROPH/DIAG INJ IV PUSH: CPT

## 2022-03-03 PROCEDURE — 82040 ASSAY OF SERUM ALBUMIN: CPT | Performed by: EMERGENCY MEDICINE

## 2022-03-03 PROCEDURE — 84484 ASSAY OF TROPONIN QUANT: CPT | Performed by: EMERGENCY MEDICINE

## 2022-03-03 PROCEDURE — 93005 ELECTROCARDIOGRAM TRACING: CPT | Performed by: EMERGENCY MEDICINE

## 2022-03-03 PROCEDURE — 81025 URINE PREGNANCY TEST: CPT | Performed by: EMERGENCY MEDICINE

## 2022-03-03 PROCEDURE — 96375 TX/PRO/DX INJ NEW DRUG ADDON: CPT

## 2022-03-03 PROCEDURE — 99285 EMERGENCY DEPT VISIT HI MDM: CPT | Mod: 25

## 2022-03-03 PROCEDURE — 81001 URINALYSIS AUTO W/SCOPE: CPT | Performed by: EMERGENCY MEDICINE

## 2022-03-03 PROCEDURE — 80053 COMPREHEN METABOLIC PANEL: CPT | Performed by: EMERGENCY MEDICINE

## 2022-03-03 PROCEDURE — 250N000011 HC RX IP 250 OP 636: Performed by: EMERGENCY MEDICINE

## 2022-03-03 PROCEDURE — 36415 COLL VENOUS BLD VENIPUNCTURE: CPT | Performed by: EMERGENCY MEDICINE

## 2022-03-03 RX ORDER — HYDROCODONE BITARTRATE AND ACETAMINOPHEN 5; 325 MG/1; MG/1
1-2 TABLET ORAL EVERY 4 HOURS PRN
Qty: 18 TABLET | Refills: 0 | Status: SHIPPED | OUTPATIENT
Start: 2022-03-03 | End: 2022-03-07

## 2022-03-03 RX ORDER — ONDANSETRON 2 MG/ML
4 INJECTION INTRAMUSCULAR; INTRAVENOUS ONCE
Status: COMPLETED | OUTPATIENT
Start: 2022-03-03 | End: 2022-03-03

## 2022-03-03 RX ORDER — ONDANSETRON 4 MG/1
4-8 TABLET, ORALLY DISINTEGRATING ORAL EVERY 8 HOURS PRN
Qty: 10 TABLET | Refills: 0 | Status: SHIPPED | OUTPATIENT
Start: 2022-03-03 | End: 2022-03-07

## 2022-03-03 RX ORDER — KETOROLAC TROMETHAMINE 15 MG/ML
15 INJECTION, SOLUTION INTRAMUSCULAR; INTRAVENOUS ONCE
Status: COMPLETED | OUTPATIENT
Start: 2022-03-03 | End: 2022-03-03

## 2022-03-03 RX ORDER — MORPHINE SULFATE 4 MG/ML
4 INJECTION, SOLUTION INTRAMUSCULAR; INTRAVENOUS ONCE
Status: COMPLETED | OUTPATIENT
Start: 2022-03-03 | End: 2022-03-03

## 2022-03-03 RX ADMIN — MORPHINE SULFATE 4 MG: 4 INJECTION, SOLUTION INTRAMUSCULAR; INTRAVENOUS at 04:47

## 2022-03-03 RX ADMIN — KETOROLAC TROMETHAMINE 15 MG: 15 INJECTION, SOLUTION INTRAMUSCULAR; INTRAVENOUS at 04:48

## 2022-03-03 RX ADMIN — ONDANSETRON 4 MG: 2 INJECTION INTRAMUSCULAR; INTRAVENOUS at 04:48

## 2022-03-03 NOTE — ED NOTES
Pt here for similar sx back in January of this year, states pain is worse tonight.  Is no longer taking the prescribed pepcid.

## 2022-03-03 NOTE — ED PROVIDER NOTES
EMERGENCY DEPARTMENT ENCOUnter      NAME: Tony Nova  AGE: 26 year old female  YOB: 1995  MRN: [id number]  EVALUATION DATE & TIME: 3/3/2022  3:55 AM    PCP: Tiana Sifuentes Md    ED PROVIDER: Siria Nunez MD      Chief Complaint   Patient presents with     Abdominal Pain     Chest Pain         FINAL IMPRESSION:  1. Atypical chest pain    2. Biliary colic    3. Calculus of gallbladder without cholecystitis without obstruction          ED COURSE & MEDICAL DECISION MAKING:      In summary the pateint is a 25 yo female that presents to the ED for evaluation of chest and back pain.  She has no evidence of ACS, thromboembolism, or pneumonia.  I suspect her symptoms are likely secondary to biliary colic from her known cholelithiasis.  4:14 AM I met with the patient, obtained history, performed an initial exam, and discussed options and plan for diagnostics and treatment here in the ED.  Morphine 4 mg and toradol 15 mg IV was administered for pain.  Zofran 4 mg IV was administered for nausea  5:15 AM I rechecked patient. Pain has improved to a 5/10. Patient would not like additional medications for pain at this time.  Since she is still having discomfort, we will obtain a repeat ultrasound.  0630-pain completely resolved and ultrasound reveals no cholecystitis, choledocholithiasis or other concerning findings.  We will discharge to home with symptomatic treatment.    At the conclusion of the encounter I discussed the results of all of the tests and the disposition. The questions were answered. The patient or family acknowledged understanding and was agreeable with the care plan.       MEDICATIONS GIVEN IN THE EMERGENCY:  Medications   morphine (PF) injection 4 mg (4 mg Intravenous Given 3/3/22 0447)   ondansetron (ZOFRAN) injection 4 mg (4 mg Intravenous Given 3/3/22 0448)   ketorolac (TORADOL) injection 15 mg (15 mg Intravenous Given 3/3/22 0448)       NEW PRESCRIPTIONS STARTED AT TODAY'S ER  VISIT  Discharge Medication List as of 3/3/2022  6:44 AM      START taking these medications    Details   HYDROcodone-acetaminophen (NORCO) 5-325 MG tablet Take 1-2 tablets by mouth every 4 hours as needed for pain or moderate to severe pain, Disp-18 tablet, R-0, Local Print      ondansetron (ZOFRAN ODT) 4 MG ODT tab Take 1-2 tablets (4-8 mg) by mouth every 8 hours as needed for nausea or vomiting, Disp-10 tablet, R-0, Local Print                =================================================================    HPI    Tony Nova is a 26 year old female with a pertinent history of HTN and polycystic ovaries who presents to this ED via walk-in for evaluation of chest pain.    Patient reports constant mid-sternal chest pain that started last night at 2200. She describes pain as 7-8/10 and notes associating back pain and one episode of vomiting. Patient reports she took TUMS and Omeprazole for symptoms. Of note, patient did eat a fried chicked sandwich from [employer] last night. Otherwise, she denies any fever, chills, urinary symptoms, and irregular bowel movements. She states her last bowel movement was at 0100 today. Patient also denies any chance of pregnancy and notes her last menstrual period was in January, which is not unusual as she has irregular periods. Patient reports a history of gallstones but denies any other significant medical history, daily medications, and allergies to medications. Patient does not smoke or drink alcohol. She works at the [employer]. Patient lives with her partner. No other complaints at this time.    Per chart review, patient was seen at [facility] Emergency Room on 1/19/22. EKG negative. US on 11/15 shoes cholelithiasis. CT shows cholelithiasis no cholecystitis. Lab work showing negative troponin, normal D-dimer, normal hemoglobin white blood cell count. Normal liver function test. Patient given antiacid medication with improvement in symptoms. Patient discharged  home.    REVIEW OF SYSTEMS     Constitutional:  Denies fever or chills  HENT:  Denies sore throat   Respiratory:  Denies cough or shortness of breath   Cardiovascular:  Denies palpitations. Positive for chest pain.  GI:  Denies abdominal pain. Positive for nausea and vomiting.  Musculoskeletal:  Denies any new extremity pain. Positive for back pain secondary to chest pain  Skin:  Denies rash   Neurologic:  Denies headache, focal weakness or sensory changes    All other systems reviewed and are negative      PAST MEDICAL HISTORY:  Past Medical History:   Diagnosis Date     Chronic hypertension      Hypertension      PCOS (polycystic ovarian syndrome)        PAST SURGICAL HISTORY:  Past Surgical History:   Procedure Laterality Date     ENDOMETRIAL BIOPSY             CURRENT MEDICATIONS:    HYDROcodone-acetaminophen (NORCO) 5-325 MG tablet  ondansetron (ZOFRAN ODT) 4 MG ODT tab  acetaminophen (TYLENOL) 325 MG tablet  famotidine (PEPCID) 20 MG tablet  ferrous sulfate (FE TABS) 325 (65 Fe) MG EC tablet  ibuprofen (ADVIL/MOTRIN) 600 MG tablet  ibuprofen (ADVIL/MOTRIN) 800 MG tablet  labetalol (NORMODYNE) 100 MG tablet  levonorgestrel-ethinyl estradiol (AVIANE) 0.1-20 MG-MCG tablet  Prenatal Vit-Fe Fumarate-FA (PRENATAL MULTIVITAMIN W/IRON) 27-0.8 MG tablet  senna-docusate (SENOKOT-S/PERICOLACE) 8.6-50 MG tablet        ALLERGIES:  No Known Allergies    FAMILY HISTORY:  History reviewed. No pertinent family history.    SOCIAL HISTORY:   Social History     Socioeconomic History     Marital status: Single     Spouse name: None     Number of children: None     Years of education: None     Highest education level: None   Occupational History     None   Tobacco Use     Smoking status: Never Smoker     Smokeless tobacco: Never Used   Substance and Sexual Activity     Alcohol use: Never     Drug use: Never     Sexual activity: Yes     Partners: Male   Other Topics Concern     None   Social History Narrative     None     Social  Determinants of Health     Financial Resource Strain: Not on file   Food Insecurity: Not on file   Transportation Needs: Not on file   Physical Activity: Not on file   Stress: Not on file   Social Connections: Not on file   Intimate Partner Violence: Not on file   Housing Stability: Not on file       VITALS:  No data found.    PHYSICAL EXAM    Constitutional:  Well developed, Well nourished,  HENT:  Normocephalic, Atraumatic, Bilateral external ears normal, Oropharynx moist, Nose normal.   Neck:  Normal range of motion, No meningismus, No stridor.   Eyes:  EOMI, Conjunctiva normal, No discharge.   Respiratory:  Normal breath sounds, No respiratory distress, No wheezing, No chest tenderness.   Cardiovascular:  Normal heart rate, Normal rhythm, No murmurs  GI:  Soft, No tenderness, No guarding, No CVA tenderness.   Musculoskeletal:  Neurovascularly intact distally, No edema, No tenderness, No cyanosis, Good range of motion in all major joints. No tenderness to palpation or major deformities noted.   Integument:  Warm, Dry, No erythema, No rash.   Lymphatic:  No lymphadenopathy noted.   Neurologic:  Alert & oriented x 3, Normal motor function,  No focal deficits noted.   Psychiatric:  Affect normal, Judgment normal, Mood normal.      LAB:  All pertinent labs reviewed and interpreted.  Results for orders placed or performed during the hospital encounter of 03/03/22   Abdomen US, limited (RUQ only)    Impression    IMPRESSION:  1.  Cholelithiasis without evidence for cholecystitis.    2.  Diffuse fatty infiltration of the liver.    3.  Otherwise negative right upper quadrant ultrasound.             Comprehensive metabolic panel   Result Value Ref Range    Sodium 141 136 - 145 mmol/L    Potassium 3.9 3.5 - 5.0 mmol/L    Chloride 103 98 - 107 mmol/L    Carbon Dioxide (CO2) 25 22 - 31 mmol/L    Anion Gap 13 5 - 18 mmol/L    Urea Nitrogen 13 8 - 22 mg/dL    Creatinine 0.82 0.60 - 1.10 mg/dL    Calcium 9.8 8.5 - 10.5 mg/dL     Glucose 115 70 - 125 mg/dL    Alkaline Phosphatase 56 45 - 120 U/L    AST 18 0 - 40 U/L    ALT 18 0 - 45 U/L    Protein Total 7.8 6.0 - 8.0 g/dL    Albumin 3.9 3.5 - 5.0 g/dL    Bilirubin Total 0.4 0.0 - 1.0 mg/dL    GFR Estimate >90 >60 mL/min/1.73m2   Result Value Ref Range    Lipase 27 0 - 52 U/L   UA with Microscopic reflex to Culture    Specimen: Urine, Midstream   Result Value Ref Range    Color Urine Yellow Colorless, Straw, Light Yellow, Yellow    Appearance Urine Cloudy (A) Clear    Glucose Urine Negative Negative mg/dL    Bilirubin Urine Negative Negative    Ketones Urine Negative Negative mg/dL    Specific Gravity Urine 1.018 1.001 - 1.030    Blood Urine Negative Negative    pH Urine 7.5 (H) 5.0 - 7.0    Protein Albumin Urine Negative Negative mg/dL    Urobilinogen Urine <2.0 <2.0 mg/dL    Nitrite Urine Negative Negative    Leukocyte Esterase Urine Negative Negative    Amorphous Crystals Urine Moderate (A) None Seen /HPF    RBC Urine 0 <=2 /HPF    WBC Urine 0 <=5 /HPF    Squamous Epithelials Urine 5 (H) <=1 /HPF   Result Value Ref Range    Troponin I <0.01 0.00 - 0.29 ng/mL   CBC with platelets and differential   Result Value Ref Range    WBC Count 7.9 4.0 - 11.0 10e3/uL    RBC Count 5.27 (H) 3.80 - 5.20 10e6/uL    Hemoglobin 12.7 11.7 - 15.7 g/dL    Hematocrit 41.2 35.0 - 47.0 %    MCV 78 78 - 100 fL    MCH 24.1 (L) 26.5 - 33.0 pg    MCHC 30.8 (L) 31.5 - 36.5 g/dL    RDW 12.9 10.0 - 15.0 %    Platelet Count 243 150 - 450 10e3/uL    % Neutrophils 75 %    % Lymphocytes 17 %    % Monocytes 6 %    % Eosinophils 1 %    % Basophils 1 %    % Immature Granulocytes 0 %    NRBCs per 100 WBC 0 <1 /100    Absolute Neutrophils 6.0 1.6 - 8.3 10e3/uL    Absolute Lymphocytes 1.3 0.8 - 5.3 10e3/uL    Absolute Monocytes 0.5 0.0 - 1.3 10e3/uL    Absolute Eosinophils 0.0 0.0 - 0.7 10e3/uL    Absolute Basophils 0.0 0.0 - 0.2 10e3/uL    Absolute Immature Granulocytes 0.0 <=0.4 10e3/uL    Absolute NRBCs 0.0 10e3/uL   ECG  12-LEAD WITH MUSE (LHE)   Result Value Ref Range    Systolic Blood Pressure 131 mmHg    Diastolic Blood Pressure 74 mmHg    Ventricular Rate 58 BPM    Atrial Rate 58 BPM    HI Interval 160 ms    QRS Duration 80 ms     ms    QTc 420 ms    P Axis 57 degrees    R AXIS 32 degrees    T Axis 6 degrees    Interpretation ECG       Sinus bradycardia  Otherwise normal ECG  When compared with ECG of 2022 06:49,  No significant change was found  Confirmed by SEE ED PROVIDER NOTE FOR, ECG INTERPRETATION ([id number]),  ONEIDA ESPANA ([id number]) on 3/4/2022 3:15:54 PM     HCG qualitative urine POCT   Result Value Ref Range    HCG Qual Urine Negative Negative    Internal QC Check POCT Valid Valid    POCT Kit Lot Number [id number]     POCT Kit Expiration Date 2023        RADIOLOGY:  I have independently reviewed and interpreted the above imaging, pending the final radiology read.  Abdomen US, limited (RUQ only)   Final Result   IMPRESSION:   1.  Cholelithiasis without evidence for cholecystitis.      2.  Diffuse fatty infiltration of the liver.      3.  Otherwise negative right upper quadrant ultrasound.                         [id number], [id number], no st segment elevation or depression    I have independently reviewed and interpreted this EKG          I, Alysa Colbert, am serving as a scribe to document services personally performed by Dr. Nunez based on my observation and the provider's statements to me. I, Siria Nunez MD attest that Alysa Colbert is acting in a scribe capacity, has observed my performance of the services and has documented them in accordance with my direction.    Siria Nunez MD  Emergency Medicine  [facility] EMERGENCY ROOM  [address] [facility] [id number] [phone]  Dept: [phone]     Siria Nunez MD  [phone]

## 2022-03-03 NOTE — ED TRIAGE NOTES
Pt presents to ED with c/o upper abdominal pain that radiates up into her chest that started around 2200 last night.  Pt states feels like a burning pain.  No previous abdominal surgeries.  Tender in RUQ upon palpation.  Pt reports eating a [employer] crispy chicken sandwich last night.  +nausea, 1 episode of vomiting.  Pt has irregular menses.

## 2022-03-04 LAB
ATRIAL RATE - MUSE: 58 BPM
DIASTOLIC BLOOD PRESSURE - MUSE: 74 MMHG
INTERPRETATION ECG - MUSE: NORMAL
P AXIS - MUSE: 57 DEGREES
PR INTERVAL - MUSE: 160 MS
QRS DURATION - MUSE: 80 MS
QT - MUSE: 428 MS
QTC - MUSE: 420 MS
R AXIS - MUSE: 32 DEGREES
SYSTOLIC BLOOD PRESSURE - MUSE: 131 MMHG
T AXIS - MUSE: 6 DEGREES
VENTRICULAR RATE- MUSE: 58 BPM

## 2022-03-05 NOTE — TELEPHONE ENCOUNTER
REFERRAL INFORMATION:    Referring Provider:  Walt Avilez MD    Referring Clinic:  [facility] General Surgery [place]     Reason for Visit/Diagnosis: Abdominal Pain referred by Walt Avilez MD// RECS in epic// appt per pt     FUTURE VISIT INFORMATION:    Appointment Date: 6/1/22    Appointment Time: 1PM     NOTES STATUS DETAILS   OFFICE NOTE from Referring Provider Internal 11/30/21 referral    OFFICE NOTE from Other Specialist N/A    HOSPITAL DISCHARGE SUMMARY/  ED VISITS Internal 1/19/22 ED note from [facility]    OPERATIVE REPORT N/A    MEDICATION LIST Internal         ENDOSCOPY  N/A    COLONOSCOPY N/A    ERCP N/A    EUS N/A    STOOL TESTING N/A    PERTINENT LABS N/A    PATHOLOGY REPORTS (RELATED) N/A    IMAGING (CT, MRI, EGD, MRCP, Small Bowel Follow Through/SBT, MR/CT Enterography) Internal 3/3/22 US Abdomen   11/29/21 CT Abdomen

## 2022-03-07 PROBLEM — O10.019 BENIGN ESSENTIAL HYPERTENSION IN OBSTETRIC CONTEXT: Status: ACTIVE | Noted: 2022-03-07

## 2022-03-07 PROBLEM — E28.2 POLYCYSTIC OVARIES: Status: ACTIVE | Noted: 2022-03-07

## 2022-03-07 NOTE — PATIENT INSTRUCTIONS
Gallbladder education & surgery packet provided to pt.    Lonnie HI [facility]  Surgery Clinic [facility] [place]  Weight Management Clinic - [address] [address]  Office: [phone]  Fax: [phone]

## 2022-03-11 NOTE — PROGRESS NOTES
History:   Tony Nova is a 26 year old female referred to general surgery by Dr. Morris for cholelithiasis.  She has actually known about having gallstones for about a year.  She had a work-up for constipation and she was diagnosed with them.  Now over the last couple of months she has had 2 large episodes of pain that have brought her into the ED.  She overall thought it was just really bad heartburn.  She describes chest and epigastric pain described as a pressure.  It was associated with nausea and vomiting.  Each time she went to the ER in January and March she had had a shake before the episode started.  They overall lasted about 8 hours.  Tylenol would help a little bit with the pain.  Overall omeprazole has not helped.  She has also had associated symptoms of gassiness and bloating.    Allergies:  Patient has no known allergies.    Past medical history:  Obesity  PCOS    Past surgical history:  Endometrial biopsy    Current medications:     ferrous sulfate (FE TABS) 325 (65 Fe) MG EC tablet, Take 325 mg by mouth daily, Disp: , Rfl:      Prenatal Vit-Fe Fumarate-FA (PRENATAL MULTIVITAMIN W/IRON) 27-0.8 MG tablet, Take 1 tablet by mouth daily, Disp: , Rfl:     Family history:  Denies a family history of bleeding, clotting, or anesthesia problems    Social history:  Reports that she has never smoked. She has never used smokeless tobacco. She reports that she does not drink alcohol and does not use drugs.    Review of Systems:  General: No complaints or constitutional symptoms  Skin: No complaints or symptoms   Hematologic/Lymphatic: No symptoms or complaints  Psychiatric: No symptoms or complaints  Endocrine: No excessive fatigue, no hypermetabolic symptoms reported  Respiratory: No cough, shortness of breath, or wheezing  Cardiovascular: No chest pain or dyspnea on exertion  Gastrointestinal: As per HPI  Musculoskeletal: No recent injuries reported  Neurological: No focal neurologic defects reported.   "    Exam:  /70   Ht 1.448 m (4' 9\")   Wt 74.8 kg (165 lb)   BMI 35.71 kg/m    Body mass index is 35.71 kg/m .  General: Alert, cooperative, appears stated age   Skin: Skin color, texture, turgor normal, no rashes or lesions   Lymphatic: No obvious adenopathy, no swelling   Eyes: No scleral icterus, pupils equal  HENT: No traumatic injury to the head or face, no gross abnormalities  Lungs: Normal respiratory effort, breath sounds equal bilaterally  Heart: Regular rate and rhythm  Abdomen: Obese, soft, nondistended, nontender to palpation  Musculoskeletal: No obvious swelling or deformities  Neurologic: Grossly intact      Imaging:   Pertinent images personally reviewed by myself and discussed with the patient.    Radiology reports:  EXAM: US ABDOMEN LIMITED  LOCATION: [facility]  DATE/TIME: 3/3/2022 5:55 AM     INDICATION: Known gallstones with persistent pain.  COMPARISON: CT from 11/29/2021.  TECHNIQUE: Limited abdominal ultrasound.     FINDINGS:  GALLBLADDER: Cholelithiasis. No gallbladder wall thickening or pericholecystic fluid. Sonographic Trimble sign is negative.  BILE DUCTS: No biliary dilatation. The common duct measures 4 mm.  LIVER: Increased echogenicity from diffuse fatty infiltration. No focal mass.  RIGHT KIDNEY: No hydronephrosis.  PANCREAS: The visualized portions are normal.  No ascites.                                                                   IMPRESSION:  1.  Cholelithiasis without evidence for cholecystitis.  2.  Diffuse fatty infiltration of the liver.  3.  Otherwise negative right upper quadrant ultrasound.    My interpretation:  Solitary gallstone within gallbladder    Assessment/Plan:   Tony Nova is a 26 year old female with signs and symptoms consistent with biliary colic.  I have explained the pathophysiology of gallbladder disease in detail as well as the surgical versus non-operative management strategies.  The risks of surgery and anesthesia " include, but are not limited to, bleeding, infection, injury to surrounding structures, the need to convert to an open procedure, blood clots, stroke, heart attack and death.  Specifically we discussed the risk of damage to the common bile duct and hepatic vessels, and the complications that may arise from damage to these structures.  Additionally, the risks of non-operative management were discussed which include, but are not limited to, worsening infection, increased pain, sepsis and death.     She understands everything which was discussed and is interested in pursuing conservative management.  She understands that she is overweight and would like to work on losing some weight.  She had also recently been on a keto diet for her PCOS.  She thinks that the keto diet did not help her symptoms.  She will continue to monitor her symptoms.  If they do continue and she is interested in pursuing surgical management, she will give my office a call.  At that time I would place orders for laparoscopic cholecystectomy at the outpatient surgery center under general anesthesia.      Belen Shankar DO  General Surgeon  [facility]  Surgery [address]  [address]  [place] [address]  Office: [phone]  Employed by - [employer]

## 2022-03-14 ENCOUNTER — OFFICE VISIT (OUTPATIENT)
Dept: SURGERY | Facility: CLINIC | Age: 27
End: 2022-03-14
Payer: COMMERCIAL

## 2022-03-14 VITALS
BODY MASS INDEX: 35.6 KG/M2 | DIASTOLIC BLOOD PRESSURE: 70 MMHG | WEIGHT: 165 LBS | HEIGHT: 57 IN | SYSTOLIC BLOOD PRESSURE: 112 MMHG

## 2022-03-14 DIAGNOSIS — K80.50 BILIARY COLIC: Primary | ICD-10-CM

## 2022-03-14 PROCEDURE — 99214 OFFICE O/P EST MOD 30 MIN: CPT | Performed by: SURGERY

## 2022-03-14 NOTE — LETTER
3/14/2022         RE: Tony Nova  [address] [place] [facility] [address]        Dear Colleague,    Thank you for referring your patient, Tony Nova, to the [facility] [place]. Please see a copy of my visit note below.    History:   Tony Nova is a 26 year old female referred to general surgery by Dr. Morris for cholelithiasis.  She has actually known about having gallstones for about a year.  She had a work-up for constipation and she was diagnosed with them.  Now over the last couple of months she has had 2 large episodes of pain that have brought her into the ED.  She overall thought it was just really bad heartburn.  She describes chest and epigastric pain described as a pressure.  It was associated with nausea and vomiting.  Each time she went to the ER in January and March she had had a shake before the episode started.  They overall lasted about 8 hours.  Tylenol would help a little bit with the pain.  Overall omeprazole has not helped.  She has also had associated symptoms of gassiness and bloating.    Allergies:  Patient has no known allergies.    Past medical history:  Obesity  PCOS    Past surgical history:  Endometrial biopsy    Current medications:     ferrous sulfate (FE TABS) 325 (65 Fe) MG EC tablet, Take 325 mg by mouth daily, Disp: , Rfl:      Prenatal Vit-Fe Fumarate-FA (PRENATAL MULTIVITAMIN W/IRON) 27-0.8 MG tablet, Take 1 tablet by mouth daily, Disp: , Rfl:     Family history:  Denies a family history of bleeding, clotting, or anesthesia problems    Social history:  Reports that she has never smoked. She has never used smokeless tobacco. She reports that she does not drink alcohol and does not use drugs.    Review of Systems:  General: No complaints or constitutional symptoms  Skin: No complaints or symptoms   Hematologic/Lymphatic: No symptoms or complaints  Psychiatric: No symptoms or complaints  Endocrine: No excessive fatigue, no hypermetabolic  "symptoms reported  Respiratory: No cough, shortness of breath, or wheezing  Cardiovascular: No chest pain or dyspnea on exertion  Gastrointestinal: As per HPI  Musculoskeletal: No recent injuries reported  Neurological: No focal neurologic defects reported.      Exam:  /70   Ht 1.448 m (4' 9\")   Wt 74.8 kg (165 lb)   BMI 35.71 kg/m    Body mass index is 35.71 kg/m .  General: Alert, cooperative, appears stated age   Skin: Skin color, texture, turgor normal, no rashes or lesions   Lymphatic: No obvious adenopathy, no swelling   Eyes: No scleral icterus, pupils equal  HENT: No traumatic injury to the head or face, no gross abnormalities  Lungs: Normal respiratory effort, breath sounds equal bilaterally  Heart: Regular rate and rhythm  Abdomen: Obese, soft, nondistended, nontender to palpation  Musculoskeletal: No obvious swelling or deformities  Neurologic: Grossly intact      Imaging:   Pertinent images personally reviewed by myself and discussed with the patient.    Radiology reports:  EXAM: US ABDOMEN LIMITED  LOCATION: [facility]  DATE/TIME: 3/3/2022 5:55 AM     INDICATION: Known gallstones with persistent pain.  COMPARISON: CT from 11/29/2021.  TECHNIQUE: Limited abdominal ultrasound.     FINDINGS:  GALLBLADDER: Cholelithiasis. No gallbladder wall thickening or pericholecystic fluid. Sonographic Trimble sign is negative.  BILE DUCTS: No biliary dilatation. The common duct measures 4 mm.  LIVER: Increased echogenicity from diffuse fatty infiltration. No focal mass.  RIGHT KIDNEY: No hydronephrosis.  PANCREAS: The visualized portions are normal.  No ascites.                                                                   IMPRESSION:  1.  Cholelithiasis without evidence for cholecystitis.  2.  Diffuse fatty infiltration of the liver.  3.  Otherwise negative right upper quadrant ultrasound.    My interpretation:  Solitary gallstone within gallbladder    Assessment/Plan:   Tony Nova " is a 26 year old female with signs and symptoms consistent with biliary colic.  I have explained the pathophysiology of gallbladder disease in detail as well as the surgical versus non-operative management strategies.  The risks of surgery and anesthesia include, but are not limited to, bleeding, infection, injury to surrounding structures, the need to convert to an open procedure, blood clots, stroke, heart attack and death.  Specifically we discussed the risk of damage to the common bile duct and hepatic vessels, and the complications that may arise from damage to these structures.  Additionally, the risks of non-operative management were discussed which include, but are not limited to, worsening infection, increased pain, sepsis and death.     She understands everything which was discussed and is interested in pursuing conservative management.  She understands that she is overweight and would like to work on losing some weight.  She had also recently been on a keto diet for her PCOS.  She thinks that the keto diet did not help her symptoms.  She will continue to monitor her symptoms.  If they do continue and she is interested in pursuing surgical management, she will give my office a call.  At that time I would place orders for laparoscopic cholecystectomy at the outpatient surgery center under general anesthesia.      Belen Shankar DO  General Surgeon  [facility]  Surgery [address] [address]  Office: [phone]  Employed by - [employer]        Again, thank you for allowing me to participate in the care of your patient.        Sincerely,        Belen Shankar DO

## 2022-03-20 ENCOUNTER — HEALTH MAINTENANCE LETTER (OUTPATIENT)
Age: 27
End: 2022-03-20

## 2022-06-01 ENCOUNTER — PRE VISIT (OUTPATIENT)
Dept: GASTROENTEROLOGY | Facility: CLINIC | Age: 27
End: 2022-06-01

## 2022-06-21 ENCOUNTER — PREP FOR PROCEDURE (OUTPATIENT)
Dept: SURGERY | Facility: CLINIC | Age: 27
End: 2022-06-21
Payer: COMMERCIAL

## 2022-06-21 ENCOUNTER — TELEPHONE (OUTPATIENT)
Dept: SURGERY | Facility: CLINIC | Age: 27
End: 2022-06-21
Payer: COMMERCIAL

## 2022-06-21 DIAGNOSIS — K80.50 BILIARY COLIC: Primary | ICD-10-CM

## 2022-06-21 NOTE — TELEPHONE ENCOUNTER
Patient called in and left a message that she is ready to pursue surgery with Dr. Shankar- Last office visit notes show that she was going to go with conservative management and call when she decides to move forward with surgery. Message sent to Dr. Shankar.

## 2022-06-23 ENCOUNTER — TELEPHONE (OUTPATIENT)
Dept: SURGERY | Facility: CLINIC | Age: 27
End: 2022-06-23

## 2022-06-23 NOTE — TELEPHONE ENCOUNTER
Patient left  wanting to schedule surgery with Dr. Shankar. I returned call and received  asking patient to return call.

## 2022-06-24 ENCOUNTER — TELEPHONE (OUTPATIENT)
Dept: SURGERY | Facility: CLINIC | Age: 27
End: 2022-06-24

## 2022-06-24 PROBLEM — K80.50 BILIARY COLIC: Status: ACTIVE | Noted: 2022-06-24

## 2022-06-24 NOTE — LETTER
We've received instruction to get you scheduled for surgery with Dr Shankar. We have that arranged as follows:     Pre-op Physical:  Dr. Shankar will complete the morning of surgery    Surgery Date: 07/15/2022     Location: [place] [address]    Approximate Arrival Time: 11:00 a.m.  (Unless instructed differently by the pre-op call nurse)     Prep Tasks and Info:     1. Review your medications with your primary care or prescribing physician; they will advise you which meds to stop and when, and when you can resume taking.  Certain medications like blood thinners, need to be stopped in advance of surgery to proceed safely.    2. You must get tested for COVID-19, even if you are vaccinated.    Outpatient Surgery:  If you are going home the same day of surgery, a home rapid antigen Covid-19 test is required 1-2 days before surgery- regardless of your vaccination status.  Take a photo of the negative result and show to the nurse on the day of surgery. If you test positive, contact our office right away to reschedule surgery. You can buy a home Covid-19 Rapid Antigen test at many local pharmacies, or you can order for free at covid.gov/tests.      3. Please shower the evening before and morning of surgery with Hibiclens or Exidine soap.  This can be found at your local pharmacy.    4. Fasting instructions will be provided by the pre-op nurse who will call you 1-3 days before surgery.  Typically we advise normal food up to 8 hours before surgery then clear liquids only up to 2 hours before surgery then nothing at all by mouth for 2 hours including no gum or candy.  The nurse will review your specific instructions with you at the call.      5. Smoking impacts your body's ability to heal properly.  If you are a smoker, we strongly urge you to stop smoking 4-6 weeks before surgery.    6. You will need an adult to drive you home and stay with you 24 hours after  surgery. Public transportation or Medical Van Services are not permitted.    7. You may have one family member wait in the lobby at the surgery center during your surgery. Visitor restrictions are subject to change, please verify with the pre-op nurse when they call.    8. If the community sees a new COVID19 surge, your procedure may need to be postponed. We will contact you if this happens. You will be screened for high-risk exposure to Covid-19 during the pre-op call.  We encourage you to quarantine yourself away from any Covid-19 people for 10 days before surgery to avoid possible last minute cancellations.   When you arrive to the surgery center, you will again be screened for COVID19 symptoms. If you screen positive, your surgery will need to be postponed.    9. We always encourage you to notify your insurance any time you have medical tests or procedures scheduled including surgery. The number is usually right on the back of your insurance card. Please call [facility] Cost of Care at [phone] if you'd like a surgery quote.       Call our office if you have any questions! Thank you!

## 2022-06-24 NOTE — TELEPHONE ENCOUNTER
Spoke with patient and scheduled surgery for July 15th. Surgery letter created and available to the patient in [facility]. Advised patient to call us with any additional questions.

## 2022-07-12 RX ORDER — TRIAMCINOLONE ACETONIDE 1 MG/G
CREAM TOPICAL
COMMUNITY
Start: 2022-04-26 | End: 2022-07-12

## 2022-07-14 ENCOUNTER — ANESTHESIA EVENT (OUTPATIENT)
Dept: SURGERY | Facility: AMBULATORY SURGERY CENTER | Age: 27
End: 2022-07-14
Payer: COMMERCIAL

## 2022-07-14 NOTE — H&P
"History:  This is a 26 year old female who presents for cholecystectomy.  Since she was last seen, she has had less severe episodes of abdominal pain.  She has also had less gassiness and bloating.  She denies new medical history since she was last seen.    Allergies:  Patient has no known allergies.     Past medical history:  Obesity  PCOS     Past surgical history:  Endometrial biopsy     Current medications:     ferrous sulfate (FE TABS) 325 (65 Fe) MG EC tablet, Take 325 mg by mouth daily, Disp: , Rfl:      Prenatal Vit-Fe Fumarate-FA (PRENATAL MULTIVITAMIN W/IRON) 27-0.8 MG tablet, Take 1 tablet by mouth daily, Disp: , Rfl:      Family history:  Denies a family history of bleeding, clotting, or anesthesia problems     Social history:  Reports that she has never smoked. She has never used smokeless tobacco. She reports that she does not drink alcohol and does not use drugs.    Physical:  /66   Pulse 72   Temp 97.1  F (36.2  C) (Temporal)   Resp 16   Ht 1.473 m (4' 10\")   Wt 74.8 kg (165 lb)   LMP 06/06/2022   SpO2 99%   BMI 34.49 kg/m    General: Alert, cooperative, appears stated age   Skin: Skin color, texture, turgor normal, no rashes or lesions   Lymphatic: No obvious adenopathy, no swelling   Eyes: No scleral icterus, pupils equal  HENT: No traumatic injury to the head or face, no gross abnormalities  Lungs: Normal respiratory effort, breath sounds equal bilaterally  Heart: Regular rate and rhythm  Abdomen: Soft, non-distended and non-tender to palpation  Neurologic: Grossly intact    IMPRESSION:  Biliary colic    PLAN:   To the OR for laparoscopic cholecystectomy    Belen Shankar DO  General Surgeon  [facility]  Surgery Clinic [address]  [address]  [place] [address]  Office: [phone]  Employed by - [facility]      "

## 2022-07-15 ENCOUNTER — HOSPITAL ENCOUNTER (OUTPATIENT)
Facility: AMBULATORY SURGERY CENTER | Age: 27
Discharge: HOME | End: 2022-07-15
Attending: SURGERY
Payer: COMMERCIAL

## 2022-07-15 ENCOUNTER — ANESTHESIA (OUTPATIENT)
Dept: SURGERY | Facility: AMBULATORY SURGERY CENTER | Age: 27
End: 2022-07-15
Payer: COMMERCIAL

## 2022-07-15 VITALS
WEIGHT: 165 LBS | SYSTOLIC BLOOD PRESSURE: 112 MMHG | HEART RATE: 64 BPM | BODY MASS INDEX: 34.63 KG/M2 | HEIGHT: 58 IN | TEMPERATURE: 97.2 F | DIASTOLIC BLOOD PRESSURE: 67 MMHG | OXYGEN SATURATION: 99 % | RESPIRATION RATE: 16 BRPM

## 2022-07-15 DIAGNOSIS — K80.50 BILIARY COLIC: ICD-10-CM

## 2022-07-15 DIAGNOSIS — G89.18 POSTOPERATIVE PAIN: Primary | ICD-10-CM

## 2022-07-15 LAB
HCG UR QL: NEGATIVE
INTERNAL QC OK POCT: NORMAL
POCT KIT EXPIRATION DATE: NORMAL
POCT KIT LOT NUMBER: NORMAL

## 2022-07-15 PROCEDURE — 81025 URINE PREGNANCY TEST: CPT | Performed by: SURGERY

## 2022-07-15 PROCEDURE — 47562 LAPAROSCOPIC CHOLECYSTECTOMY: CPT | Performed by: SURGERY

## 2022-07-15 RX ORDER — DEXAMETHASONE SODIUM PHOSPHATE 4 MG/ML
INJECTION, SOLUTION INTRA-ARTICULAR; INTRALESIONAL; INTRAMUSCULAR; INTRAVENOUS; SOFT TISSUE PRN
Status: DISCONTINUED | OUTPATIENT
Start: 2022-07-15 | End: 2022-07-15

## 2022-07-15 RX ORDER — NEOSTIGMINE METHYLSULFATE 1 MG/ML
VIAL (ML) INJECTION PRN
Status: DISCONTINUED | OUTPATIENT
Start: 2022-07-15 | End: 2022-07-15

## 2022-07-15 RX ORDER — FENTANYL CITRATE 50 UG/ML
INJECTION, SOLUTION INTRAMUSCULAR; INTRAVENOUS PRN
Status: DISCONTINUED | OUTPATIENT
Start: 2022-07-15 | End: 2022-07-15

## 2022-07-15 RX ORDER — ONDANSETRON 2 MG/ML
4 INJECTION INTRAMUSCULAR; INTRAVENOUS EVERY 30 MIN PRN
Status: DISCONTINUED | OUTPATIENT
Start: 2022-07-15 | End: 2022-07-16 | Stop reason: HOSPADM

## 2022-07-15 RX ORDER — KETAMINE HYDROCHLORIDE 10 MG/ML
INJECTION INTRAMUSCULAR; INTRAVENOUS PRN
Status: DISCONTINUED | OUTPATIENT
Start: 2022-07-15 | End: 2022-07-15

## 2022-07-15 RX ORDER — KETOROLAC TROMETHAMINE 30 MG/ML
INJECTION, SOLUTION INTRAMUSCULAR; INTRAVENOUS PRN
Status: DISCONTINUED | OUTPATIENT
Start: 2022-07-15 | End: 2022-07-15

## 2022-07-15 RX ORDER — FENTANYL CITRATE 0.05 MG/ML
25 INJECTION, SOLUTION INTRAMUSCULAR; INTRAVENOUS
Status: DISCONTINUED | OUTPATIENT
Start: 2022-07-15 | End: 2022-07-16 | Stop reason: HOSPADM

## 2022-07-15 RX ORDER — HYDROCODONE BITARTRATE AND ACETAMINOPHEN 5; 325 MG/1; MG/1
1-2 TABLET ORAL EVERY 6 HOURS PRN
Qty: 20 TABLET | Refills: 0 | Status: SHIPPED | OUTPATIENT
Start: 2022-07-15

## 2022-07-15 RX ORDER — MAGNESIUM SULFATE HEPTAHYDRATE 40 MG/ML
4 INJECTION, SOLUTION INTRAVENOUS ONCE
Status: COMPLETED | OUTPATIENT
Start: 2022-07-15 | End: 2022-07-15

## 2022-07-15 RX ORDER — OXYCODONE HYDROCHLORIDE 5 MG/1
5 TABLET ORAL EVERY 4 HOURS PRN
Status: DISCONTINUED | OUTPATIENT
Start: 2022-07-15 | End: 2022-07-16 | Stop reason: HOSPADM

## 2022-07-15 RX ORDER — MEPERIDINE HYDROCHLORIDE 25 MG/ML
12.5 INJECTION INTRAMUSCULAR; INTRAVENOUS; SUBCUTANEOUS
Status: DISCONTINUED | OUTPATIENT
Start: 2022-07-15 | End: 2022-07-16 | Stop reason: HOSPADM

## 2022-07-15 RX ORDER — PROPOFOL 10 MG/ML
INJECTION, EMULSION INTRAVENOUS PRN
Status: DISCONTINUED | OUTPATIENT
Start: 2022-07-15 | End: 2022-07-15

## 2022-07-15 RX ORDER — ONDANSETRON 2 MG/ML
INJECTION INTRAMUSCULAR; INTRAVENOUS PRN
Status: DISCONTINUED | OUTPATIENT
Start: 2022-07-15 | End: 2022-07-15

## 2022-07-15 RX ORDER — GLYCOPYRROLATE 0.2 MG/ML
INJECTION, SOLUTION INTRAMUSCULAR; INTRAVENOUS PRN
Status: DISCONTINUED | OUTPATIENT
Start: 2022-07-15 | End: 2022-07-15

## 2022-07-15 RX ORDER — BUPIVACAINE HYDROCHLORIDE AND EPINEPHRINE 2.5; 5 MG/ML; UG/ML
INJECTION, SOLUTION INFILTRATION; PERINEURAL PRN
Status: DISCONTINUED | OUTPATIENT
Start: 2022-07-15 | End: 2022-07-15 | Stop reason: HOSPADM

## 2022-07-15 RX ORDER — CEFAZOLIN SODIUM 2 G/100ML
2 INJECTION, SOLUTION INTRAVENOUS
Status: COMPLETED | OUTPATIENT
Start: 2022-07-15 | End: 2022-07-15

## 2022-07-15 RX ORDER — SODIUM CHLORIDE, SODIUM LACTATE, POTASSIUM CHLORIDE, CALCIUM CHLORIDE 600; 310; 30; 20 MG/100ML; MG/100ML; MG/100ML; MG/100ML
INJECTION, SOLUTION INTRAVENOUS CONTINUOUS
Status: DISCONTINUED | OUTPATIENT
Start: 2022-07-15 | End: 2022-07-16 | Stop reason: HOSPADM

## 2022-07-15 RX ORDER — LIDOCAINE HYDROCHLORIDE 20 MG/ML
INJECTION, SOLUTION INFILTRATION; PERINEURAL PRN
Status: DISCONTINUED | OUTPATIENT
Start: 2022-07-15 | End: 2022-07-15

## 2022-07-15 RX ORDER — LIDOCAINE 40 MG/G
CREAM TOPICAL
Status: DISCONTINUED | OUTPATIENT
Start: 2022-07-15 | End: 2022-07-16 | Stop reason: HOSPADM

## 2022-07-15 RX ORDER — HYDROMORPHONE HCL IN WATER/PF 6 MG/30 ML
0.2 PATIENT CONTROLLED ANALGESIA SYRINGE INTRAVENOUS EVERY 5 MIN PRN
Status: DISCONTINUED | OUTPATIENT
Start: 2022-07-15 | End: 2022-07-16 | Stop reason: HOSPADM

## 2022-07-15 RX ORDER — ONDANSETRON 4 MG/1
4 TABLET, ORALLY DISINTEGRATING ORAL EVERY 30 MIN PRN
Status: DISCONTINUED | OUTPATIENT
Start: 2022-07-15 | End: 2022-07-16 | Stop reason: HOSPADM

## 2022-07-15 RX ORDER — SODIUM CHLORIDE, SODIUM LACTATE, POTASSIUM CHLORIDE, AND CALCIUM CHLORIDE .6; .31; .03; .02 G/100ML; G/100ML; G/100ML; G/100ML
IRRIGANT IRRIGATION PRN
Status: DISCONTINUED | OUTPATIENT
Start: 2022-07-15 | End: 2022-07-15 | Stop reason: HOSPADM

## 2022-07-15 RX ORDER — FENTANYL CITRATE 0.05 MG/ML
25 INJECTION, SOLUTION INTRAMUSCULAR; INTRAVENOUS EVERY 5 MIN PRN
Status: DISCONTINUED | OUTPATIENT
Start: 2022-07-15 | End: 2022-07-16 | Stop reason: HOSPADM

## 2022-07-15 RX ORDER — ACETAMINOPHEN 325 MG/1
975 TABLET ORAL ONCE
Status: COMPLETED | OUTPATIENT
Start: 2022-07-15 | End: 2022-07-15

## 2022-07-15 RX ADMIN — Medication 3.75 MG: at 10:56

## 2022-07-15 RX ADMIN — GLYCOPYRROLATE 0.75 MG: 0.2 INJECTION, SOLUTION INTRAMUSCULAR; INTRAVENOUS at 10:56

## 2022-07-15 RX ADMIN — KETOROLAC TROMETHAMINE 15 MG: 30 INJECTION, SOLUTION INTRAMUSCULAR; INTRAVENOUS at 10:55

## 2022-07-15 RX ADMIN — FENTANYL CITRATE 25 MCG: 0.05 INJECTION, SOLUTION INTRAMUSCULAR; INTRAVENOUS at 11:28

## 2022-07-15 RX ADMIN — CEFAZOLIN SODIUM 2 G: 2 INJECTION, SOLUTION INTRAVENOUS at 10:10

## 2022-07-15 RX ADMIN — Medication 30 MG: at 10:05

## 2022-07-15 RX ADMIN — LIDOCAINE HYDROCHLORIDE 60 MG: 20 INJECTION, SOLUTION INFILTRATION; PERINEURAL at 10:05

## 2022-07-15 RX ADMIN — MAGNESIUM SULFATE HEPTAHYDRATE 4 G: 40 INJECTION, SOLUTION INTRAVENOUS at 09:33

## 2022-07-15 RX ADMIN — FENTANYL CITRATE 50 MCG: 50 INJECTION, SOLUTION INTRAMUSCULAR; INTRAVENOUS at 10:18

## 2022-07-15 RX ADMIN — Medication 100 MCG: at 10:24

## 2022-07-15 RX ADMIN — OXYCODONE HYDROCHLORIDE 5 MG: 5 TABLET ORAL at 12:38

## 2022-07-15 RX ADMIN — KETAMINE HYDROCHLORIDE 20 MG: 10 INJECTION INTRAMUSCULAR; INTRAVENOUS at 10:05

## 2022-07-15 RX ADMIN — SODIUM CHLORIDE, SODIUM LACTATE, POTASSIUM CHLORIDE, CALCIUM CHLORIDE: 600; 310; 30; 20 INJECTION, SOLUTION INTRAVENOUS at 09:31

## 2022-07-15 RX ADMIN — ONDANSETRON 4 MG: 2 INJECTION INTRAMUSCULAR; INTRAVENOUS at 10:52

## 2022-07-15 RX ADMIN — PROPOFOL 160 MG: 10 INJECTION, EMULSION INTRAVENOUS at 10:05

## 2022-07-15 RX ADMIN — DEXAMETHASONE SODIUM PHOSPHATE 8 MG: 4 INJECTION, SOLUTION INTRA-ARTICULAR; INTRALESIONAL; INTRAMUSCULAR; INTRAVENOUS; SOFT TISSUE at 10:05

## 2022-07-15 RX ADMIN — Medication 100 MCG: at 10:43

## 2022-07-15 RX ADMIN — ACETAMINOPHEN 975 MG: 325 TABLET ORAL at 09:32

## 2022-07-15 RX ADMIN — FENTANYL CITRATE 50 MCG: 50 INJECTION, SOLUTION INTRAMUSCULAR; INTRAVENOUS at 10:05

## 2022-07-15 NOTE — DISCHARGE INSTRUCTIONS
You have received 975 mg of Acetaminophen (Tylenol) at 9:30am. Please do not take an additional dose of Tylenol until after 3:30pm.    Do not exceed 4,000 mg of acetaminophen during a 24 hour period and keep in mind that acetaminophen can also be found in many over-the-counter cold medications as well as narcotics that may be given for pain.       You received a dose of IV Toradol at 11am. Please do not take any additional Ibuprofen products (Aleve/Advil/Motrin/Naproxen/Celebrex) until after 5pm.

## 2022-07-15 NOTE — ANESTHESIA PROCEDURE NOTES
Airway       Patient location during procedure: OR       Procedure Start/Stop Times: 7/15/2022 10:07 AM  Staff -        CRNA: Doc Arshad APRN CRNA       Performed By: CRNA  Consent for Airway        Urgency: elective  Indications and Patient Condition       Indications for airway management: bernard-procedural       Induction type:intravenous       Mask difficulty assessment: 1 - vent by mask    Final Airway Details       Final airway type: endotracheal airway       Successful airway: ETT - single and Oral  Endotracheal Airway Details        ETT size (mm): 7.0       Cuffed: yes       Successful intubation technique: direct laryngoscopy       DL Blade Type: Coello 2       Grade View of Cords: 1       Adjucts: stylet and tooth guard       Position: Right       Measured from: gums/teeth       Secured at (cm): 22       Bite block used: None    Post intubation assessment        Placement verified by: capnometry, equal breath sounds and chest rise        Number of attempts at approach: 1       Number of other approaches attempted: 0       Secured with: silk tape       Ease of procedure: easy       Dentition: Intact and Unchanged    Medication(s) Administered   Medication Administration Time: 7/15/2022 10:07 AM

## 2022-07-15 NOTE — OP NOTE
Name:  Tony Nova  [facility]  Procedure Date:  7/15/2022      LAPAROSCOPIC CHOLECYSTECTOMY WITH LYSIS OF ADHESIONS      Pre-Procedure Diagnosis:  Biliary colic    Post-Procedure Diagnosis:    Chronic cholecystitis with hydrops  Intraabdominal adhesions    Surgeon:  Belen Shankar DO    Assist:  None    Anesthesia Type:    GET    Estimated Blood Loss:   2 cc    Specimens:    Gallbladder       Drains:   None    Complications:    None apparent    Indication for procedure:  This is a 26-year-old female who has been having intermittent upper abdominal pain.  It mostly occurs after fatty foods.  She has modified her diet and has had less attacks.  In order to prevent future complications, she has elected for operative intervention through cholecystectomy.    Operative Report:    After informed consent was obtained, and the risks and benefits of the procedure were discussed, the patient was brought back to the operative suite and placed in the supine position.  General endotracheal anesthesia was administered and tolerated well.  A Time Out was held, and the abdomen was prepped and draped in the usual sterile fashion.  Local anesthetic agent was injected into the skin superior and lateral to the umbilicus and an incision made.  A 5mm optical trocar was placed under direct visualization.  Pneumoperitoneum was established to a pressure of 15 mm Hg.  After local infiltration, three more ports were then placed under direct vision in the epigastrium, right subcostal midclavicular line, and right subcostal mid-axillary line.  The gallbladder was identified, the fundus grasped and retracted cephalad. The gallbladder appeared distended.  There were multiple omental adhesions to the gallbladder and right edge of the liver, consistent with a history of acute cholecystitis.  These adhesions were quite firm and lysed away with electrocautery.  The infundibulum was grasped and retracted laterally, exposing the  peritoneum overlying the triangle of Calot. This was dissected bluntly and with hook electrocautery until the cystic duct was clearly identified and freed circumferentially. The same occurred with the cystic artery so that a critical view was obtained.  The cystic duct was quite dilated and multiple stones were inside of it.  These were milked back into the gallbladder.  At the neck, a small hole was made and mucus came out of the gallbladder.  This demonstrated findings consistent with hydrops.  The junction of the gallbladder and cystic duct was clearly identified and clipped proximally with 3 large clips and on the gallbladder side with 1 clip.  The cystic duct was divided. The cystic artery was similarly clipped and cut.  The gallbladder was dissected from the liver bed in retrograde fashion with the electrocautery. The gallbladder was removed with the assistance of an endocatch bag and extracted from the 11 mm trocar site.  Hemostasis was assured.  The fascia of the 11 mm trocar site was then closed with 0 Vicryl.  Pneumoperitoneum was reduced.  The trocars were removed. The skin was then closed with 4-0 Vicryl, followed by a sterile dressing.    Instrument, sponge, and needle counts were correct at closure and at the conclusion of the case.     Disposition:  The patient tolerated the procedure well.  They were transferred to the postanesthesia care unit in stable condition.        Belen Shankar DO  General Surgeon  [facility]  Surgery [address] [address]  Office: [phone]  Employed by - [facility]

## 2022-07-15 NOTE — PROGRESS NOTES
I have viewed a picture that the patient brought with showing a negative COVID-19 result.    Yen Fernandez RN on 7/15/2022 at 9:23 AM

## 2022-07-15 NOTE — ANESTHESIA PREPROCEDURE EVALUATION
Anesthesia Pre-Procedure Evaluation    Patient: Tony Nova   MRN: [id number] : 1995        Procedure : Procedure(s):  CHOLECYSTECTOMY, LAPAROSCOPIC          Past Medical History:   Diagnosis Date     Benign essential hypertension in obstetric context 2022     Chronic hypertension      Hypertension       (normal spontaneous vaginal delivery) 10/24/2020     PCOS (polycystic ovarian syndrome)      Polycystic ovaries 2022      labor 10/24/2020      labor in second trimester with  delivery 10/27/2020      Past Surgical History:   Procedure Laterality Date     ENDOMETRIAL BIOPSY        No Known Allergies   Social History     Tobacco Use     Smoking status: Never Smoker     Smokeless tobacco: Never Used   Substance Use Topics     Alcohol use: Never      Wt Readings from Last 1 Encounters:   22 74.8 kg (165 lb)        Anesthesia Evaluation   Pt has not had prior anesthetic         ROS/MED HX  ENT/Pulmonary:  - neg pulmonary ROS     Neurologic:  - neg neurologic ROS     Cardiovascular:     (+) hypertension-----    METS/Exercise Tolerance:     Hematologic:  - neg hematologic  ROS     Musculoskeletal:  - neg musculoskeletal ROS     GI/Hepatic:  - neg GI/hepatic ROS     Renal/Genitourinary:  - neg Renal ROS     Endo:     (+) Obesity,     Psychiatric/Substance Use:  - neg psychiatric ROS     Infectious Disease:  - neg infectious disease ROS     Malignancy:  - neg malignancy ROS     Other:  - neg other ROS          Physical Exam    Airway  airway exam normal           Respiratory Devices and Support         Dental  no notable dental history         Cardiovascular   cardiovascular exam normal          Pulmonary   pulmonary exam normal                OUTSIDE LABS:  CBC:   Lab Results   Component Value Date    WBC 7.9 2022    WBC 9.1 2022    HGB 12.7 2022    HGB 12.4 2022    HCT 41.2 2022    HCT 40.0 2022     2022      01/19/2022     BMP:   Lab Results   Component Value Date     03/03/2022     01/19/2022    POTASSIUM 3.9 03/03/2022    POTASSIUM 3.8 01/19/2022    CHLORIDE 103 03/03/2022    CHLORIDE 101 01/19/2022    CO2 25 03/03/2022    CO2 26 01/19/2022    BUN 13 03/03/2022    BUN 12 01/19/2022    CR 0.82 03/03/2022    CR 0.77 01/19/2022     03/03/2022     01/19/2022     COAGS: No results found for: PTT, INR, FIBR  POC:   Lab Results   Component Value Date    HCG Negative 03/03/2022     HEPATIC:   Lab Results   Component Value Date    ALBUMIN 3.9 03/03/2022    PROTTOTAL 7.8 03/03/2022    ALT 18 03/03/2022    AST 18 03/03/2022    ALKPHOS 56 03/03/2022    BILITOTAL 0.4 03/03/2022     OTHER:   Lab Results   Component Value Date    KATARZYNA 9.8 03/03/2022    LIPASE 27 03/03/2022       Anesthesia Plan    ASA Status:  2      Anesthesia Type: General.     - Airway: ETT   Induction: Intravenous, Propofol.   Maintenance: Balanced.        Consents    Anesthesia Plan(s) and associated risks, benefits, and realistic alternatives discussed. Questions answered and patient/representative(s) expressed understanding.     - Discussed: Risks, Benefits and Alternatives for BOTH SEDATION and the PROCEDURE were discussed     - Discussed with:  Patient         Postoperative Care    Pain management: Multi-modal analgesia.   PONV prophylaxis: Ondansetron (or other 5HT-3), Dexamethasone or Solumedrol     Comments:                Demond Wade MD

## 2022-07-15 NOTE — ANESTHESIA POSTPROCEDURE EVALUATION
Patient: Tony Nova    Procedure: Procedure(s):  CHOLECYSTECTOMY, LAPAROSCOPIC,WITH LYSIS OF ADHESIONS       Anesthesia Type:  General    Note:  Disposition: Outpatient   Postop Pain Control: Uneventful            Sign Out: Well controlled pain   PONV: No   Neuro/Psych: Uneventful            Sign Out: Acceptable/Baseline neuro status   Airway/Respiratory: Uneventful            Sign Out: Acceptable/Baseline resp. status   CV/Hemodynamics: Uneventful            Sign Out: Acceptable CV status; No obvious hypovolemia; No obvious fluid overload   Other NRE: NONE   DID A NON-ROUTINE EVENT OCCUR? No           Last vitals:  Vitals Value Taken Time   /61 07/15/22 1200   Temp     Pulse 56 07/15/22 1200   Resp     SpO2 97 % 07/15/22 1200       Electronically Signed By: Demond Wade MD  July 15, 2022  12:59 PM

## 2022-07-15 NOTE — ANESTHESIA CARE TRANSFER NOTE
Patient: Tony Nova    Procedure: Procedure(s):  CHOLECYSTECTOMY, LAPAROSCOPIC,WITH LYSIS OF ADHESIONS       Diagnosis: Biliary colic [K80.50]  Diagnosis Additional Information: No value filed.    Anesthesia Type:   General     Note:    Oropharynx: oropharynx clear of all foreign objects  Level of Consciousness: drowsy  Oxygen Supplementation: face mask  Level of Supplemental Oxygen (L/min / FiO2): 8  Independent Airway: airway patency satisfactory and stable  Dentition: dentition unchanged  Vital Signs Stable: post-procedure vital signs reviewed and stable  Report to RN Given: handoff report given  Patient transferred to: PACU  Comments: Patient transferred to PACU by CRNA. Report given to PACU RN, all questions answered. Patient hemodynamically stable. CRNA ensured PACU RN was comfortable taking over care.  Handoff Report: Identifed the Patient, Identified the Reponsible Provider, Reviewed the pertinent medical history, Discussed the surgical course, Reviewed Intra-OP anesthesia mangement and issues during anesthesia, Set expectations for post-procedure period and Allowed opportunity for questions and acknowledgement of understanding      Vitals:  Vitals Value Taken Time   /79    Temp 97.3    Pulse 73    Resp 14    SpO2 100        Electronically Signed By: KATHE Morel CRNA  July 15, 2022  11:11 AM

## 2022-07-20 ENCOUNTER — DOCUMENTATION ONLY (OUTPATIENT)
Dept: SURGERY | Facility: CLINIC | Age: 27
End: 2022-07-20

## 2022-07-20 NOTE — PROGRESS NOTES
Received [facility] Paperwork Via Fax and drop off for both Tony and her Spouse Completed on 07/20/22    Leave dates for Tony 07/15/22-07/30/22    Faxed to Patricia ARRIAGA @ [phone]    Leave Dates For Kem Ba   07/18/22-07/29/22    Faxed to [phone]      Will place to be scanned to HIM    Richelle Webb

## 2022-07-29 ENCOUNTER — TELEPHONE (OUTPATIENT)
Dept: SURGERY | Facility: CLINIC | Age: 27
End: 2022-07-29

## 2022-07-29 NOTE — TELEPHONE ENCOUNTER
Patient scheduled to see JAIME Patterson on 8/1 per Dr. Shankar.       [facility]      Paula Randall RN  [facility]  General Surgery  [address] [address]  [email or web address]   Office:[phone]  Employed by [facility],

## 2022-07-29 NOTE — TELEPHONE ENCOUNTER
[facility] Post-Op Phone Call                     Surgeon: Belen Shankar     Date of Surgery: 07/15/2022  Discharge Date: 07/15/2022    Date/Time Called:   Date: 7/29/2022 Time: 11:09 AM   Attempt: Second    Pain Control:  Intensity: Moderate (4 - 5)  Duration/Location/Explain: Left side under rib cage since surgery  What makes it better/worse? Resting     Medications:  Narcotic Use - No    Incisions:  Drainage? clean and dry  Any fever type symptoms? No    GI:  Nausea? Yes  Vomiting? No  BM? Yes  Gas? Yes  Voiding Frequency? 4 or more/day   Appetite? Fair    Activity:  Walking activity? Yes      Patient complains of left side sharp pain of a 4-5 underneath the rib cage since surgery that has not improved. No fevers or chills. Incisions are healing up well. She did have some nausea yesterday. Normal bowel movements. Will check with Dr. Shankar.      [facility]      Paula Randall RN  [facility]  General Surgery  [facility] [address]  [email or web address]   Office:[phone]  Employed by [employer],              Call completed by: Paula Randall RN

## 2022-08-01 ENCOUNTER — LAB (OUTPATIENT)
Dept: LAB | Facility: CLINIC | Age: 27
End: 2022-08-01
Payer: COMMERCIAL

## 2022-08-01 ENCOUNTER — OFFICE VISIT (OUTPATIENT)
Dept: SURGERY | Facility: CLINIC | Age: 27
End: 2022-08-01
Payer: COMMERCIAL

## 2022-08-01 DIAGNOSIS — K80.50 BILIARY COLIC: ICD-10-CM

## 2022-08-01 DIAGNOSIS — K80.50 BILIARY COLIC: Primary | ICD-10-CM

## 2022-08-01 LAB
ALBUMIN SERPL BCG-MCNC: 4.4 G/DL (ref 3.5–5.2)
ALP SERPL-CCNC: 62 U/L (ref 35–104)
ALT SERPL W P-5'-P-CCNC: 28 U/L (ref 10–35)
AST SERPL W P-5'-P-CCNC: 30 U/L (ref 10–35)
BILIRUB DIRECT SERPL-MCNC: NORMAL MG/DL
BILIRUB SERPL-MCNC: 0.5 MG/DL
ERYTHROCYTE [DISTWIDTH] IN BLOOD BY AUTOMATED COUNT: 12.3 % (ref 10–15)
HCT VFR BLD AUTO: 41.4 % (ref 35–47)
HGB BLD-MCNC: 13.2 G/DL (ref 11.7–15.7)
MCH RBC QN AUTO: 24.7 PG (ref 26.5–33)
MCHC RBC AUTO-ENTMCNC: 31.9 G/DL (ref 31.5–36.5)
MCV RBC AUTO: 78 FL (ref 78–100)
PLATELET # BLD AUTO: 254 10E3/UL (ref 150–450)
PROT SERPL-MCNC: 7.9 G/DL (ref 6.4–8.3)
RBC # BLD AUTO: 5.34 10E6/UL (ref 3.8–5.2)
WBC # BLD AUTO: 7.6 10E3/UL (ref 4–11)

## 2022-08-01 PROCEDURE — 84075 ASSAY ALKALINE PHOSPHATASE: CPT

## 2022-08-01 PROCEDURE — 84155 ASSAY OF PROTEIN SERUM: CPT

## 2022-08-01 PROCEDURE — 84450 TRANSFERASE (AST) (SGOT): CPT

## 2022-08-01 PROCEDURE — 85027 COMPLETE CBC AUTOMATED: CPT

## 2022-08-01 PROCEDURE — 99024 POSTOP FOLLOW-UP VISIT: CPT | Performed by: PHYSICIAN ASSISTANT

## 2022-08-01 PROCEDURE — 83690 ASSAY OF LIPASE: CPT

## 2022-08-01 PROCEDURE — 82247 BILIRUBIN TOTAL: CPT

## 2022-08-01 PROCEDURE — 82040 ASSAY OF SERUM ALBUMIN: CPT

## 2022-08-01 PROCEDURE — 84460 ALANINE AMINO (ALT) (SGPT): CPT

## 2022-08-01 PROCEDURE — 36415 COLL VENOUS BLD VENIPUNCTURE: CPT

## 2022-08-01 NOTE — LETTER
8/1/2022         RE: Tony Nova  [address] [place] [address]        Dear Colleague,    Thank you for referring your patient, Tony Nova, to the [facility] [place]. Please see a copy of my visit note below.    HPI: Pt is here for follow up s/p lap pawel and JOLYNN with Dr. Shankar on 7/15.   She has been having continued epigastric and LLQ pain since surgery. She states it has been getting better these last few days. No n/v. Tolerating diet. Bowels functioning normally. Denies fevers or chills.     LMP 06/06/2022     EXAM:  GENERAL:Appears well  ABDOMEN:  Soft, +BS, mild ttp mostly centered at epigastric port site, no rebound or guarding  SURGICAL WOUNDS:  Incisions healing well, no induration or drainage.      Assessment/Plan: Overall, she seems to be doing well with some lingering post op pain.   I doubt any infection or bile leak at this point, but will order some labs for further work up.    CBC, LFTs, and lipase all normal - she was called with these results.     Leonardo Willis PA-C  [phone]  General Surgery           Again, thank you for allowing me to participate in the care of your patient.        Sincerely,        Leonardo Willis PA-C

## 2022-08-02 LAB — LIPASE SERPL-CCNC: 26 U/L (ref 13–60)

## 2022-08-02 NOTE — PROGRESS NOTES
HPI: Pt is here for follow up s/p lap pawel and JOLYNN with Dr. Shankar on 7/15.   She has been having continued epigastric and LLQ pain since surgery. She states it has been getting better these last few days. No n/v. Tolerating diet. Bowels functioning normally. Denies fevers or chills.     LMP 06/06/2022     EXAM:  GENERAL:Appears well  ABDOMEN:  Soft, +BS, mild ttp mostly centered at epigastric port site, no rebound or guarding  SURGICAL WOUNDS:  Incisions healing well, no induration or drainage.      Assessment/Plan: Overall, she seems to be doing well with some lingering post op pain.   I doubt any infection or bile leak at this point, but will order some labs for further work up.    CBC, LFTs, and lipase all normal - she was called with these results.     Leonardo Willis PA-C  [phone]  General Surgery

## 2022-09-11 ENCOUNTER — HEALTH MAINTENANCE LETTER (OUTPATIENT)
Age: 27
End: 2022-09-11

## 2023-04-30 ENCOUNTER — HEALTH MAINTENANCE LETTER (OUTPATIENT)
Age: 28
End: 2023-04-30

## 2023-05-23 ENCOUNTER — ANCILLARY PROCEDURE (OUTPATIENT)
Dept: ULTRASOUND IMAGING | Facility: CLINIC | Age: 28
End: 2023-05-23
Attending: NURSE PRACTITIONER
Payer: COMMERCIAL

## 2023-05-23 DIAGNOSIS — R10.31 RLQ ABDOMINAL PAIN: ICD-10-CM

## 2023-05-23 PROCEDURE — 76830 TRANSVAGINAL US NON-OB: CPT | Mod: GC | Performed by: RADIOLOGY

## 2023-05-23 PROCEDURE — 76856 US EXAM PELVIC COMPLETE: CPT | Mod: GC | Performed by: RADIOLOGY

## 2023-12-16 ENCOUNTER — HEALTH MAINTENANCE LETTER (OUTPATIENT)
Age: 28
End: 2023-12-16

## 2025-01-12 ENCOUNTER — HEALTH MAINTENANCE LETTER (OUTPATIENT)
Age: 30
End: 2025-01-12